# Patient Record
Sex: FEMALE | Race: ASIAN | NOT HISPANIC OR LATINO
[De-identification: names, ages, dates, MRNs, and addresses within clinical notes are randomized per-mention and may not be internally consistent; named-entity substitution may affect disease eponyms.]

---

## 2019-05-16 PROBLEM — Z00.00 ENCOUNTER FOR PREVENTIVE HEALTH EXAMINATION: Status: ACTIVE | Noted: 2019-05-16

## 2019-06-13 ENCOUNTER — APPOINTMENT (OUTPATIENT)
Dept: ANTEPARTUM | Facility: CLINIC | Age: 34
End: 2019-06-13
Payer: MEDICARE

## 2019-06-13 ENCOUNTER — ASOB RESULT (OUTPATIENT)
Age: 34
End: 2019-06-13

## 2019-06-13 PROCEDURE — 76813 OB US NUCHAL MEAS 1 GEST: CPT

## 2019-06-13 PROCEDURE — 36416 COLLJ CAPILLARY BLOOD SPEC: CPT

## 2019-06-13 PROCEDURE — 76801 OB US < 14 WKS SINGLE FETUS: CPT

## 2019-07-25 ENCOUNTER — APPOINTMENT (OUTPATIENT)
Dept: ANTEPARTUM | Facility: CLINIC | Age: 34
End: 2019-07-25
Payer: COMMERCIAL

## 2019-07-25 ENCOUNTER — ASOB RESULT (OUTPATIENT)
Age: 34
End: 2019-07-25

## 2019-07-25 PROCEDURE — 99212 OFFICE O/P EST SF 10 MIN: CPT | Mod: 25

## 2019-07-25 PROCEDURE — 76811 OB US DETAILED SNGL FETUS: CPT

## 2019-08-23 ENCOUNTER — ASOB RESULT (OUTPATIENT)
Age: 34
End: 2019-08-23

## 2019-08-23 ENCOUNTER — APPOINTMENT (OUTPATIENT)
Dept: ANTEPARTUM | Facility: CLINIC | Age: 34
End: 2019-08-23
Payer: COMMERCIAL

## 2019-08-23 PROCEDURE — 59020 FETAL CONTRACT STRESS TEST: CPT

## 2019-08-23 PROCEDURE — 76816 OB US FOLLOW-UP PER FETUS: CPT

## 2019-08-23 PROCEDURE — 76817 TRANSVAGINAL US OBSTETRIC: CPT

## 2019-08-23 PROCEDURE — 99241 OFFICE CONSULTATION NEW/ESTAB PATIENT 15 MIN: CPT | Mod: 25

## 2019-09-05 ENCOUNTER — OUTPATIENT (OUTPATIENT)
Dept: INPATIENT UNIT | Facility: HOSPITAL | Age: 34
LOS: 1 days | Discharge: HOME | End: 2019-09-05

## 2019-09-05 VITALS
TEMPERATURE: 98 F | SYSTOLIC BLOOD PRESSURE: 110 MMHG | HEART RATE: 98 BPM | DIASTOLIC BLOOD PRESSURE: 63 MMHG | OXYGEN SATURATION: 100 % | RESPIRATION RATE: 16 BRPM

## 2019-09-05 DIAGNOSIS — O26.899 OTHER SPECIFIED PREGNANCY RELATED CONDITIONS, UNSPECIFIED TRIMESTER: ICD-10-CM

## 2019-09-05 DIAGNOSIS — Z3A.00 WEEKS OF GESTATION OF PREGNANCY NOT SPECIFIED: ICD-10-CM

## 2019-09-06 VITALS — DIASTOLIC BLOOD PRESSURE: 79 MMHG | SYSTOLIC BLOOD PRESSURE: 115 MMHG | HEART RATE: 85 BPM

## 2019-09-06 LAB
APPEARANCE UR: CLEAR — SIGNIFICANT CHANGE UP
BILIRUB UR-MCNC: NEGATIVE — SIGNIFICANT CHANGE UP
BLOOD UR QL VISUAL: NEGATIVE — SIGNIFICANT CHANGE UP
COLOR SPEC: SIGNIFICANT CHANGE UP
GLUCOSE UR-MCNC: NEGATIVE — SIGNIFICANT CHANGE UP
KETONES UR-MCNC: NEGATIVE — SIGNIFICANT CHANGE UP
LEUKOCYTE ESTERASE UR-ACNC: NEGATIVE — SIGNIFICANT CHANGE UP
NITRITE UR-MCNC: NEGATIVE — SIGNIFICANT CHANGE UP
PH UR: 6.5 — SIGNIFICANT CHANGE UP (ref 5–8)
PROT UR-MCNC: NEGATIVE — SIGNIFICANT CHANGE UP
SP GR SPEC: 1.02 — SIGNIFICANT CHANGE UP (ref 1–1.04)
UROBILINOGEN FLD QL: NORMAL — SIGNIFICANT CHANGE UP

## 2019-09-06 NOTE — OB PROVIDER TRIAGE NOTE - NSHPLABSRESULTS_GEN_ALL_CORE
Urinalysis Basic - ( 06 Sep 2019 03:00 )    Color: LIGHT YELLOW / Appearance: CLEAR / S.016 / pH: 6.5  Gluc: NEGATIVE / Ketone: NEGATIVE  / Bili: NEGATIVE / Urobili: NORMAL   Blood: NEGATIVE / Protein: NEGATIVE / Nitrite: NEGATIVE   Leuk Esterase: NEGATIVE / RBC: x / WBC x   Sq Epi: x / Non Sq Epi: x / Bacteria: x

## 2019-09-06 NOTE — OB PROVIDER TRIAGE NOTE - PLAN OF CARE
D/C Home  D/W Dr. Montalvo  No further evidence of cervical shortening  Normal fetal testing  Follow up with the ATU today for further evaluation- [phone]  Return for decreased fetal movement, loss of fluid or vaginal bleeding   Continue vaginal progesterone  Continue pelvic rest

## 2019-09-06 NOTE — OB PROVIDER TRIAGE NOTE - HISTORY OF PRESENT ILLNESS
35y/o  @25.1wks presents from the office for vaginal pressure and short cervix. Patient states they told her in the office today it was 1.8cm. Last ATU scan on  was 1.6cm.   HX of incompetant cervix  Denies LOF/VB  Reports good fetal movement     Allergies:Denies  Medications: ASA, Vaginal Progesterone, PNV    Medical HX: Hyperlipidemia  Surgical HX: Denies  Denies Etoh/Drugs/Smoke/Psy HX

## 2019-09-06 NOTE — OB PROVIDER TRIAGE NOTE - ADDITIONAL INSTRUCTIONS
Follow up with the ATU today for further evaluation- [phone]  Return for decreased fetal movement, loss of fluid or vaginal bleeding   Continue vaginal progesterone  Continue pelvic rest

## 2019-09-06 NOTE — OB PROVIDER TRIAGE NOTE - NSHPPHYSICALEXAM_GEN_ALL_CORE
Assessment reveals VSS  Abdomen soft, NT, gravid   SSE: cervix appears closed, no blood or discharged noted  TAS: bpp8/8, MVP6.59, vtx, post placenta,   TVS: Cervical length 1.6-1.8cm, minimal funneling  Cat 1 tracing, [id number], no ctx on TOCO Assessment reveals VSS  Abdomen soft, NT, gravid   SSE: cervix appears closed, no blood or discharged noted  TAS: bpp8/8, MVP6.59, vtx, post placenta,   TVS: Cervical length 1.6-1.8cm, minimal funneling  Cat 1 tracing, [id number], no ctx on TOCO    PLAN: UA Pending

## 2019-09-06 NOTE — OB PROVIDER TRIAGE NOTE - NS_OBGYNHISTORY_OBGYN_ALL_OB_FT
GYN: Denies  OB:  3/28/2018, FT, stillborn      AP course complicated by incompetent cervix  on Vaginal Progesterone

## 2019-09-07 PROBLEM — E78.5 HYPERLIPIDEMIA, UNSPECIFIED: Chronic | Status: ACTIVE | Noted: 2019-09-05

## 2019-09-11 ENCOUNTER — APPOINTMENT (OUTPATIENT)
Dept: ANTEPARTUM | Facility: CLINIC | Age: 34
End: 2019-09-11

## 2019-09-20 ENCOUNTER — ASOB RESULT (OUTPATIENT)
Age: 34
End: 2019-09-20

## 2019-09-20 ENCOUNTER — APPOINTMENT (OUTPATIENT)
Dept: ANTEPARTUM | Facility: CLINIC | Age: 34
End: 2019-09-20
Payer: COMMERCIAL

## 2019-09-20 PROCEDURE — 76819 FETAL BIOPHYS PROFIL W/O NST: CPT

## 2019-09-20 PROCEDURE — 76816 OB US FOLLOW-UP PER FETUS: CPT

## 2019-10-07 ENCOUNTER — ASOB RESULT (OUTPATIENT)
Age: 34
End: 2019-10-07

## 2019-10-07 ENCOUNTER — OUTPATIENT (OUTPATIENT)
Dept: OUTPATIENT SERVICES | Facility: HOSPITAL | Age: 34
LOS: 1 days | End: 2019-10-07

## 2019-10-07 ENCOUNTER — INPATIENT (INPATIENT)
Facility: HOSPITAL | Age: 34
LOS: 0 days | Discharge: HOME | End: 2019-10-08
Attending: OBSTETRICS & GYNECOLOGY | Admitting: OBSTETRICS & GYNECOLOGY

## 2019-10-07 ENCOUNTER — APPOINTMENT (OUTPATIENT)
Dept: ANTEPARTUM | Facility: HOSPITAL | Age: 34
End: 2019-10-07
Payer: COMMERCIAL

## 2019-10-07 VITALS
HEART RATE: 100 BPM | DIASTOLIC BLOOD PRESSURE: 73 MMHG | RESPIRATION RATE: 14 BRPM | TEMPERATURE: 98 F | SYSTOLIC BLOOD PRESSURE: 106 MMHG

## 2019-10-07 DIAGNOSIS — O26.899 OTHER SPECIFIED PREGNANCY RELATED CONDITIONS, UNSPECIFIED TRIMESTER: ICD-10-CM

## 2019-10-07 DIAGNOSIS — Z3A.00 WEEKS OF GESTATION OF PREGNANCY NOT SPECIFIED: ICD-10-CM

## 2019-10-07 DIAGNOSIS — O60.03 PRETERM LABOR WITHOUT DELIVERY, THIRD TRIMESTER: ICD-10-CM

## 2019-10-07 DIAGNOSIS — O60.00 PRETERM LABOR WITHOUT DELIVERY, UNSPECIFIED TRIMESTER: ICD-10-CM

## 2019-10-07 LAB
ANISOCYTOSIS BLD QL: SLIGHT — SIGNIFICANT CHANGE UP
APPEARANCE UR: CLEAR — SIGNIFICANT CHANGE UP
BASOPHILS # BLD AUTO: 0.06 K/UL — SIGNIFICANT CHANGE UP (ref 0–0.2)
BASOPHILS NFR BLD AUTO: 0.5 % — SIGNIFICANT CHANGE UP (ref 0–2)
BASOPHILS NFR SPEC: 0.9 % — SIGNIFICANT CHANGE UP (ref 0–2)
BILIRUB UR-MCNC: NEGATIVE — SIGNIFICANT CHANGE UP
BLASTS # FLD: 0 % — SIGNIFICANT CHANGE UP (ref 0–0)
BLD GP AB SCN SERPL QL: NEGATIVE — SIGNIFICANT CHANGE UP
BLOOD UR QL VISUAL: NEGATIVE — SIGNIFICANT CHANGE UP
COLOR SPEC: YELLOW — SIGNIFICANT CHANGE UP
EOSINOPHIL # BLD AUTO: 0.19 K/UL — SIGNIFICANT CHANGE UP (ref 0–0.5)
EOSINOPHIL NFR BLD AUTO: 1.6 % — SIGNIFICANT CHANGE UP (ref 0–6)
EOSINOPHIL NFR FLD: 0.9 % — SIGNIFICANT CHANGE UP (ref 0–6)
GLUCOSE UR-MCNC: NEGATIVE — SIGNIFICANT CHANGE UP
HCT VFR BLD CALC: 33.6 % — LOW (ref 34.5–45)
HGB BLD-MCNC: 11.1 G/DL — LOW (ref 11.5–15.5)
HYPOCHROMIA BLD QL: SLIGHT — SIGNIFICANT CHANGE UP
IMM GRANULOCYTES NFR BLD AUTO: 3 % — HIGH (ref 0–1.5)
KETONES UR-MCNC: NEGATIVE — SIGNIFICANT CHANGE UP
LEUKOCYTE ESTERASE UR-ACNC: NEGATIVE — SIGNIFICANT CHANGE UP
LYMPHOCYTES # BLD AUTO: 1.96 K/UL — SIGNIFICANT CHANGE UP (ref 1–3.3)
LYMPHOCYTES # BLD AUTO: 16 % — SIGNIFICANT CHANGE UP (ref 13–44)
LYMPHOCYTES NFR SPEC AUTO: 11.4 % — LOW (ref 13–44)
MACROCYTES BLD QL: SLIGHT — SIGNIFICANT CHANGE UP
MAGNESIUM SERPL-MCNC: 5.3 MG/DL — HIGH (ref 1.6–2.6)
MCHC RBC-ENTMCNC: 29 PG — SIGNIFICANT CHANGE UP (ref 27–34)
MCHC RBC-ENTMCNC: 33 % — SIGNIFICANT CHANGE UP (ref 32–36)
MCV RBC AUTO: 87.7 FL — SIGNIFICANT CHANGE UP (ref 80–100)
METAMYELOCYTES # FLD: 0 % — SIGNIFICANT CHANGE UP (ref 0–1)
MONOCYTES # BLD AUTO: 0.77 K/UL — SIGNIFICANT CHANGE UP (ref 0–0.9)
MONOCYTES NFR BLD AUTO: 6.3 % — SIGNIFICANT CHANGE UP (ref 2–14)
MONOCYTES NFR BLD: 1.7 % — LOW (ref 2–9)
MYELOCYTES NFR BLD: 0 % — SIGNIFICANT CHANGE UP (ref 0–0)
NEUTROPHIL AB SER-ACNC: 78.9 % — HIGH (ref 43–77)
NEUTROPHILS # BLD AUTO: 8.89 K/UL — HIGH (ref 1.8–7.4)
NEUTROPHILS NFR BLD AUTO: 72.6 % — SIGNIFICANT CHANGE UP (ref 43–77)
NEUTS BAND # BLD: 1.8 % — SIGNIFICANT CHANGE UP (ref 0–6)
NITRITE UR-MCNC: NEGATIVE — SIGNIFICANT CHANGE UP
NRBC # FLD: 0 K/UL — SIGNIFICANT CHANGE UP (ref 0–0)
OTHER - HEMATOLOGY %: 0 — SIGNIFICANT CHANGE UP
PH UR: 7 — SIGNIFICANT CHANGE UP (ref 5–8)
PLATELET # BLD AUTO: 195 K/UL — SIGNIFICANT CHANGE UP (ref 150–400)
PLATELET COUNT - ESTIMATE: NORMAL — SIGNIFICANT CHANGE UP
PMV BLD: 9.2 FL — SIGNIFICANT CHANGE UP (ref 7–13)
POLYCHROMASIA BLD QL SMEAR: SLIGHT — SIGNIFICANT CHANGE UP
PROMYELOCYTES # FLD: 0 % — SIGNIFICANT CHANGE UP (ref 0–0)
PROT UR-MCNC: 10 — SIGNIFICANT CHANGE UP
RBC # BLD: 3.83 M/UL — SIGNIFICANT CHANGE UP (ref 3.8–5.2)
RBC # FLD: 15 % — HIGH (ref 10.3–14.5)
REVIEW TO FOLLOW: YES — SIGNIFICANT CHANGE UP
RH IG SCN BLD-IMP: POSITIVE — SIGNIFICANT CHANGE UP
RH IG SCN BLD-IMP: POSITIVE — SIGNIFICANT CHANGE UP
SP GR SPEC: 1.02 — SIGNIFICANT CHANGE UP (ref 1–1.04)
UROBILINOGEN FLD QL: NORMAL — SIGNIFICANT CHANGE UP
VARIANT LYMPHS # BLD: 4.4 % — SIGNIFICANT CHANGE UP
WBC # BLD: 12.24 K/UL — HIGH (ref 3.8–10.5)
WBC # FLD AUTO: 12.24 K/UL — HIGH (ref 3.8–10.5)

## 2019-10-07 PROCEDURE — 76819 FETAL BIOPHYS PROFIL W/O NST: CPT | Mod: 26

## 2019-10-07 RX ORDER — PROGESTERONE 200 MG/1
200 CAPSULE, LIQUID FILLED ORAL AT BEDTIME
Refills: 0 | Status: DISCONTINUED | OUTPATIENT
Start: 2019-10-08 | End: 2019-10-08

## 2019-10-07 RX ORDER — OXYTOCIN 10 UNIT/ML
VIAL (ML) INJECTION
Qty: 20 | Refills: 0 | Status: DISCONTINUED | OUTPATIENT
Start: 2019-10-07 | End: 2019-10-08

## 2019-10-07 RX ORDER — MAGNESIUM SULFATE 500 MG/ML
4 VIAL (ML) INJECTION ONCE
Refills: 0 | Status: COMPLETED | OUTPATIENT
Start: 2019-10-07 | End: 2019-10-07

## 2019-10-07 RX ORDER — AMPICILLIN TRIHYDRATE 250 MG
1 CAPSULE ORAL EVERY 4 HOURS
Refills: 0 | Status: DISCONTINUED | OUTPATIENT
Start: 2019-10-07 | End: 2019-10-08

## 2019-10-07 RX ORDER — AMPICILLIN TRIHYDRATE 250 MG
2 CAPSULE ORAL ONCE
Refills: 0 | Status: COMPLETED | OUTPATIENT
Start: 2019-10-07 | End: 2019-10-07

## 2019-10-07 RX ORDER — ASPIRIN/CALCIUM CARB/MAGNESIUM 324 MG
81 TABLET ORAL DAILY
Refills: 0 | Status: DISCONTINUED | OUTPATIENT
Start: 2019-10-07 | End: 2019-10-08

## 2019-10-07 RX ORDER — PROGESTERONE 200 MG/1
100 CAPSULE, LIQUID FILLED ORAL ONCE
Refills: 0 | Status: COMPLETED | OUTPATIENT
Start: 2019-10-07 | End: 2019-10-08

## 2019-10-07 RX ORDER — INFLUENZA VIRUS VACCINE 15; 15; 15; 15 UG/.5ML; UG/.5ML; UG/.5ML; UG/.5ML
0.5 SUSPENSION INTRAMUSCULAR ONCE
Refills: 0 | Status: DISCONTINUED | OUTPATIENT
Start: 2019-10-07 | End: 2019-10-08

## 2019-10-07 RX ORDER — PROGESTERONE 200 MG/1
100 CAPSULE, LIQUID FILLED ORAL AT BEDTIME
Refills: 0 | Status: DISCONTINUED | OUTPATIENT
Start: 2019-10-07 | End: 2019-10-07

## 2019-10-07 RX ORDER — MAGNESIUM SULFATE 500 MG/ML
2 VIAL (ML) INJECTION
Qty: 40 | Refills: 0 | Status: DISCONTINUED | OUTPATIENT
Start: 2019-10-07 | End: 2019-10-08

## 2019-10-07 RX ORDER — SODIUM CHLORIDE 9 MG/ML
1000 INJECTION, SOLUTION INTRAVENOUS
Refills: 0 | Status: DISCONTINUED | OUTPATIENT
Start: 2019-10-07 | End: 2019-10-08

## 2019-10-07 RX ADMIN — Medication 108 GRAM(S): at 16:27

## 2019-10-07 RX ADMIN — Medication 50 GM/HR: at 12:30

## 2019-10-07 RX ADMIN — Medication 12 MILLIGRAM(S): at 12:04

## 2019-10-07 RX ADMIN — Medication 81 MILLIGRAM(S): at 21:50

## 2019-10-07 RX ADMIN — Medication 50 GM/HR: at 18:50

## 2019-10-07 RX ADMIN — Medication 1 TABLET(S): at 21:50

## 2019-10-07 RX ADMIN — Medication 300 GRAM(S): at 12:00

## 2019-10-07 RX ADMIN — Medication 216 GRAM(S): at 12:25

## 2019-10-07 RX ADMIN — SODIUM CHLORIDE 75 MILLILITER(S): 9 INJECTION, SOLUTION INTRAVENOUS at 12:04

## 2019-10-07 RX ADMIN — PROGESTERONE 100 MILLIGRAM(S): 200 CAPSULE, LIQUID FILLED ORAL at 22:09

## 2019-10-07 RX ADMIN — Medication 108 GRAM(S): at 20:22

## 2019-10-07 NOTE — OB PROVIDER TRIAGE NOTE - NS_MATERNALCONCERNS_OBGYN_ALL_OB_FT
- short cervix, last measured 1.6-1.8  on vaginal progesterone nightly  - hx of IUFD at 36 weeks 2018, weekly ATU testing

## 2019-10-07 NOTE — OB PROVIDER H&P - PROBLEM SELECTOR PLAN 1
plan discussed with dr vazquez  betamethasone for fetal lung maturity  ampicillin for GBS prophalaxysis  magnesium for tocolysis/ neuroprotection  GBS, u/a urine culture sent  routine orders

## 2019-10-07 NOTE — OB PROVIDER TRIAGE NOTE - NSOBPROVIDERNOTE_OBGYN_ALL_OB_FT
34 year old  at 29.4 weeks admitted for incompetant cervix/ labor    plan discussed with dr vazquez  Magnesium for tocolysis/ neuroprotection  Betamethasone for fetal lung maturity  Antibiotics for GBS prophalaxysis  routine  orders

## 2019-10-07 NOTE — OB PROVIDER H&P - ALERT: PERTINENT HISTORY
Fetal Non-Stress Test (NST)/1st Trimester Sonogram/BioPhysical Profile(s)/Follow up Sonogram for Growth/Fetal Sonogram

## 2019-10-07 NOTE — CHART NOTE - NSCHARTNOTEFT_GEN_A_CORE
r4ob    answered patient questions regarding management and prognosis of  labor. I explained the indication for BMZ, amp, and Mg therapy. I explained that short cervix is a risk factor for  delivery but that the majority of patients with short cervix will go on to deliver at term. however there is no way of knowing at this time when exactly she might deliver. I spent 20 minutes speaking with the patient as she also expressed significant anxiety/distress during this pregnancy after having an IUFD at 36wks with the last pregnancy.     Lucia Senior PGY4

## 2019-10-07 NOTE — OB PROVIDER TRIAGE NOTE - NSHPPHYSICALEXAM_GEN_ALL_CORE
abdomen: gravid, soft, nontender, no rebound, no guarding   TAS: breech, posterior placenta, bpp 8/8, tami 14.55  SSE: cervix appears closed, clear liquidy discharge in vault, nitrazine equivacal, fern neg  TVS CL 0.6-0.7 with large funnel  SVE 1/soft/-3    Vital Signs Last 24 Hrs  T(C): 36.5 (07 Oct 2019 09:03), Max: 36.5 (07 Oct 2019 08:36)  T(F): 97.7 (07 Oct 2019 09:03), Max: 97.7 (07 Oct 2019 08:36)  HR: 92 (07 Oct 2019 10:07) (85 - 100)  BP: 113/74 (07 Oct 2019 10:07) (106/73 - 113/74)  BP(mean): --  RR: 14 (07 Oct 2019 08:36) (14 - 14)  SpO2: --

## 2019-10-07 NOTE — OB PROVIDER TRIAGE NOTE - HISTORY OF PRESENT ILLNESS
This is a 34 year old  at 29.4 weeks gestational age presents with complaints of  pressure and lower back pain since 2am. denies LOF, VB. denies cramping/contractions. reports +GFM. denies dysuria, hematuria, foul smell, urinary symptoms, frequency  ap course complicated by:  - short cervix, last measured 1.6-1.8  on vaginal progesterone nightly  - hx of IUFD at 36 weeks 2018, weekly ATU testing

## 2019-10-08 ENCOUNTER — TRANSCRIPTION ENCOUNTER (OUTPATIENT)
Age: 34
End: 2019-10-08

## 2019-10-08 VITALS — HEART RATE: 105 BPM | OXYGEN SATURATION: 99 %

## 2019-10-08 LAB
MAGNESIUM SERPL-MCNC: 5.8 MG/DL — HIGH (ref 1.6–2.6)
MAGNESIUM SERPL-MCNC: 6.3 MG/DL — HIGH (ref 1.6–2.6)
SPECIMEN SOURCE: SIGNIFICANT CHANGE UP
SPECIMEN SOURCE: SIGNIFICANT CHANGE UP
T PALLIDUM AB TITR SER: NEGATIVE — SIGNIFICANT CHANGE UP

## 2019-10-08 RX ORDER — CALCIUM CARBONATE 500(1250)
1 TABLET ORAL
Refills: 0 | Status: DISCONTINUED | OUTPATIENT
Start: 2019-10-08 | End: 2019-10-08

## 2019-10-08 RX ORDER — PROGESTERONE 200 MG/1
0 CAPSULE, LIQUID FILLED ORAL
Qty: 0 | Refills: 0 | DISCHARGE

## 2019-10-08 RX ORDER — PROGESTERONE 200 MG/1
0 CAPSULE, LIQUID FILLED ORAL
Qty: 0 | Refills: 0 | DISCHARGE
Start: 2019-10-08

## 2019-10-08 RX ORDER — ASPIRIN/CALCIUM CARB/MAGNESIUM 324 MG
81 TABLET ORAL
Qty: 0 | Refills: 0 | DISCHARGE

## 2019-10-08 RX ORDER — ASPIRIN/CALCIUM CARB/MAGNESIUM 324 MG
1 TABLET ORAL
Qty: 0 | Refills: 0 | DISCHARGE
Start: 2019-10-08

## 2019-10-08 RX ORDER — CALCIUM CARBONATE 500(1250)
1 TABLET ORAL
Qty: 0 | Refills: 0 | DISCHARGE
Start: 2019-10-08

## 2019-10-08 RX ADMIN — Medication 108 GRAM(S): at 04:31

## 2019-10-08 RX ADMIN — Medication 12 MILLIGRAM(S): at 11:00

## 2019-10-08 RX ADMIN — PROGESTERONE 100 MILLIGRAM(S): 200 CAPSULE, LIQUID FILLED ORAL at 00:19

## 2019-10-08 RX ADMIN — Medication 108 GRAM(S): at 00:32

## 2019-10-08 RX ADMIN — Medication 1 TABLET(S): at 10:05

## 2019-10-08 NOTE — DISCHARGE NOTE ANTEPARTUM - HOSPITAL COURSE
Pt admitted with suspected  labor, BAM given. BMZ 10/7 and 10/8. No evidence of labor without contractions or cervical change. Discharged home in stable condition on HD#2.

## 2019-10-08 NOTE — CHART NOTE - NSCHARTNOTEFT_GEN_A_CORE
Referred by OB to consult on 29 weeks gestation.  Mother is a 35 yo  (1 prior IUFD at 36 weeks) at 29+4 weeks gestation, admitted to rule out PT labor due to short cervix. Male infant. BMZ 10/7-8 12pm. On Magnesium and Ampicillin.    Ms. Charles was consulted as following.  1. The NICU team will be present at her delivery. The infant will be placed under the warmer and immediately evaluated.  2. Overall survival at 29 weeks gestation is very good.  3. The infant may require respiratory support either in the form of nasal CPAP or intubation and mechanical ventilation in rare circumstances.   4. Hemodynamic status will be carefully monitored. Baby might require additional IV fluids or IV medication to maintain blood pressure or blood sugar levels.  5. The infant will be at risk for jaundice which can be treated with phototherapy.  10. Depending on the clinical status of the infant, enteral feedings may not be started immediately. The infant will receive IV fluid or nutrition as necessary. Due to immature suck/swallow, the infant may require an orogastric tube once feeds until about 32-33 weeks.  6. Septic work up may be performed at birth and the infant might require antibiotics if there is a significant risk of infection.  7. The importance of human milk as the exclusive source of nutrition for  infants was discussed. Mother will be asked to pump after delivery.  8.  The infant may be screened for IVH. The presence and severity of IVH can have adverse neurodevelopmental effects. Even in the absence of IVH the infant will be at risk for developmental delays as a consequence of prematurity.   9. Overall duration of NICU stay will depend on the clinical condition of the baby and is hard to predict, but the discharge may often happen around 35-38 weeks of gestation (approximately 6-9 weeks stay).     Ms. Charles had the opportunity to ask questions and may contact the NICU at any time should further questions arise.  Thank you for the opportunity to participate in the care of this patients and please inform us of any changes in her status.

## 2019-10-08 NOTE — DISCHARGE NOTE ANTEPARTUM - MEDICATION SUMMARY - MEDICATIONS TO TAKE
I will START or STAY ON the medications listed below when I get home from the hospital:    aspirin 81 mg oral tablet, chewable  -- 1 tab(s) by mouth once a day  -- Indication: For home med    calcium carbonate 500 mg (200 mg elemental calcium) oral tablet, chewable  -- 1 tab(s) by mouth 4 times a day, As needed, Heartburn  -- Indication: For home med    Prenatal Multivitamins with Folic Acid 1 mg oral tablet  -- 1 tab(s) by mouth once a day  -- Indication: For home med    progesterone 100 mg vaginal insert  --  vaginally   -- Indication: For home med

## 2019-10-08 NOTE — PROGRESS NOTE ADULT - SUBJECTIVE AND OBJECTIVE BOX
[facility] Progress Note     Patient examined bedside today. No complaints. At this time denies contractions, vaginal bleeding, leakage of fluid and reports positive fetal movement.   Patient reports that her previous IUFD was diagnosed at routine OB visit. Patient noted that she was having decreased fetal movement at that time and when she came in noted to have no FH. Patient was induced at [place] and delivered vaginally. No cause was found.     Vital Signs Last 24 Hours  T(C): 36.5 (10-08-19 @ 08:47), Max: 36.9 (10-07-19 @ 19:45)  HR: 105 (10-08-19 @ 10:47) (85 - 115)  BP: 127/82 (10-08-19 @ 10:27) (81/47 - 127/82)  RR: --  SpO2: 99% (10-08-19 @ 10:47) (90% - 100%)        Labs:                        11.1   12.24 )-----------( 195      ( 07 Oct 2019 12:00 )             33.6         Tracin baseline reactive, TOCO: no contractions

## 2019-10-08 NOTE — DISCHARGE NOTE ANTEPARTUM - PLAN OF CARE
continue pregnancy and prenatal care Follow up with your doctor within 1 week. Call with contractions, decreased FM, leakage of fluid, vaginal bleeding, or any other concern.

## 2019-10-08 NOTE — DISCHARGE NOTE ANTEPARTUM - CARE PLAN
Principal Discharge DX:	 labor in third trimester without delivery  Goal:	continue pregnancy and prenatal care  Assessment and plan of treatment:	Follow up with your doctor within 1 week. Call with contractions, decreased FM, leakage of fluid, vaginal bleeding, or any other concern.

## 2019-10-08 NOTE — DISCHARGE NOTE ANTEPARTUM - PATIENT PORTAL LINK FT
You can access the FollowMyHealth Patient Portal offered by [facility] by registering at the following website: [email or web address]. By joining [facility]’s FollowMyHealth portal, you will also be able to view your health information using other applications (apps) compatible with our system.

## 2019-10-08 NOTE — DISCHARGE NOTE ANTEPARTUM - CARE PROVIDER_API CALL
Wade Bellamy)  Obstetrics and Gynecology  [address] [place] [address]  Phone: [phone]  Fax: [phone]  Follow Up Time:

## 2019-10-08 NOTE — PROGRESS NOTE ADULT - ASSESSMENT
Assessment: 34 yr  @ 29w5d admitted for concern for  labor secondary to history of short cervix and exam of /-3 in setting of contraction. Patient received 1 course of betamethasone (2nd dose today) and magnesium sulfate. At this time no evidence of  labor, contractions resolved.   Patient's history also significant for IUFD, records reviewed, no evidence of placental abruption, EFW 2100g @ 36wks.     Recommendations     As no further evidence of  labor, patient stable for discharge and close outpatient fetal surveillance Would recommend to continue weekly  testing secondary to history of IUFD and serial growth sonograms.   Strict  labor precautions reviewed with patient   Follow up with primary Ob/Gyn in 1 week     Seen with Dr. Paris ([facility] Attending)     Ezequiel Butcher MD   Maternal Fetal Medicine Fellow
33 yo  29w5d a/w cervical insufficiency and advanced cervical dilation

## 2019-10-08 NOTE — PROGRESS NOTE ADULT - PROBLEM SELECTOR PLAN 1
1. Maternal well being  - CV: hemodynamically stable  - Resp: saturating well  - GI: reg diet  - Heme: SCDs for DVT ppx  - Neuro: DTR checks while on magnesium  - ID: afebrile    2. Fetal well being  - NST BID   - BPP Mondays  - prenatal vitamin  - BMZ for fetal lung maturity  - Mg for neuroprotection  - Ampicillin for GBS unknown    C Debby pgy3

## 2019-10-09 LAB
BACTERIA UR CULT: SIGNIFICANT CHANGE UP
GP B STREP GENITAL QL CULT: SIGNIFICANT CHANGE UP

## 2019-10-15 ENCOUNTER — APPOINTMENT (OUTPATIENT)
Dept: ANTEPARTUM | Facility: CLINIC | Age: 34
End: 2019-10-15
Payer: COMMERCIAL

## 2019-10-15 ENCOUNTER — ASOB RESULT (OUTPATIENT)
Age: 34
End: 2019-10-15

## 2019-10-15 ENCOUNTER — OUTPATIENT (OUTPATIENT)
Dept: OUTPATIENT SERVICES | Facility: HOSPITAL | Age: 34
LOS: 1 days | End: 2019-10-15

## 2019-10-15 PROCEDURE — 76818 FETAL BIOPHYS PROFILE W/NST: CPT | Mod: 26

## 2019-10-15 PROCEDURE — 76816 OB US FOLLOW-UP PER FETUS: CPT

## 2019-10-16 DIAGNOSIS — Z3A.30 30 WEEKS GESTATION OF PREGNANCY: ICD-10-CM

## 2019-10-16 DIAGNOSIS — O26.873 CERVICAL SHORTENING, THIRD TRIMESTER: ICD-10-CM

## 2019-10-16 DIAGNOSIS — O99.213 OBESITY COMPLICATING PREGNANCY, THIRD TRIMESTER: ICD-10-CM

## 2019-10-16 DIAGNOSIS — O60.03 PRETERM LABOR WITHOUT DELIVERY, THIRD TRIMESTER: ICD-10-CM

## 2019-10-16 DIAGNOSIS — O09.293 SUPERVISION OF PREGNANCY WITH OTHER POOR REPRODUCTIVE OR OBSTETRIC HISTORY, THIRD TRIMESTER: ICD-10-CM

## 2019-10-18 ENCOUNTER — APPOINTMENT (OUTPATIENT)
Dept: ANTEPARTUM | Facility: HOSPITAL | Age: 34
End: 2019-10-18

## 2019-10-18 ENCOUNTER — APPOINTMENT (OUTPATIENT)
Dept: ANTEPARTUM | Facility: CLINIC | Age: 34
End: 2019-10-18

## 2019-10-25 ENCOUNTER — ASOB RESULT (OUTPATIENT)
Age: 34
End: 2019-10-25

## 2019-10-25 ENCOUNTER — APPOINTMENT (OUTPATIENT)
Dept: ANTEPARTUM | Facility: CLINIC | Age: 34
End: 2019-10-25
Payer: COMMERCIAL

## 2019-10-25 ENCOUNTER — OUTPATIENT (OUTPATIENT)
Dept: OUTPATIENT SERVICES | Facility: HOSPITAL | Age: 34
LOS: 1 days | End: 2019-10-25

## 2019-10-25 ENCOUNTER — APPOINTMENT (OUTPATIENT)
Dept: ANTEPARTUM | Facility: HOSPITAL | Age: 34
End: 2019-10-25

## 2019-10-25 DIAGNOSIS — O09.293 SUPERVISION OF PREGNANCY WITH OTHER POOR REPRODUCTIVE OR OBSTETRIC HISTORY, THIRD TRIMESTER: ICD-10-CM

## 2019-10-25 DIAGNOSIS — O26.873 CERVICAL SHORTENING, THIRD TRIMESTER: ICD-10-CM

## 2019-10-25 DIAGNOSIS — O60.03 PRETERM LABOR WITHOUT DELIVERY, THIRD TRIMESTER: ICD-10-CM

## 2019-10-25 PROCEDURE — 76818 FETAL BIOPHYS PROFILE W/NST: CPT | Mod: 26

## 2019-11-01 ENCOUNTER — OUTPATIENT (OUTPATIENT)
Dept: OUTPATIENT SERVICES | Facility: HOSPITAL | Age: 34
LOS: 1 days | End: 2019-11-01

## 2019-11-01 ENCOUNTER — APPOINTMENT (OUTPATIENT)
Dept: ANTEPARTUM | Facility: CLINIC | Age: 34
End: 2019-11-01
Payer: COMMERCIAL

## 2019-11-01 ENCOUNTER — APPOINTMENT (OUTPATIENT)
Dept: ANTEPARTUM | Facility: HOSPITAL | Age: 34
End: 2019-11-01

## 2019-11-01 ENCOUNTER — ASOB RESULT (OUTPATIENT)
Age: 34
End: 2019-11-01

## 2019-11-01 PROCEDURE — 76818 FETAL BIOPHYS PROFILE W/NST: CPT | Mod: 26

## 2019-11-06 DIAGNOSIS — O09.293 SUPERVISION OF PREGNANCY WITH OTHER POOR REPRODUCTIVE OR OBSTETRIC HISTORY, THIRD TRIMESTER: ICD-10-CM

## 2019-11-06 DIAGNOSIS — O26.873 CERVICAL SHORTENING, THIRD TRIMESTER: ICD-10-CM

## 2019-11-06 DIAGNOSIS — O60.03 PRETERM LABOR WITHOUT DELIVERY, THIRD TRIMESTER: ICD-10-CM

## 2019-11-08 ENCOUNTER — APPOINTMENT (OUTPATIENT)
Dept: ANTEPARTUM | Facility: CLINIC | Age: 34
End: 2019-11-08
Payer: COMMERCIAL

## 2019-11-08 ENCOUNTER — ASOB RESULT (OUTPATIENT)
Age: 34
End: 2019-11-08

## 2019-11-08 ENCOUNTER — OUTPATIENT (OUTPATIENT)
Dept: OUTPATIENT SERVICES | Facility: HOSPITAL | Age: 34
LOS: 1 days | End: 2019-11-08

## 2019-11-08 ENCOUNTER — APPOINTMENT (OUTPATIENT)
Dept: ANTEPARTUM | Facility: HOSPITAL | Age: 34
End: 2019-11-08

## 2019-11-08 PROCEDURE — 76818 FETAL BIOPHYS PROFILE W/NST: CPT | Mod: 26

## 2019-11-08 PROCEDURE — 76816 OB US FOLLOW-UP PER FETUS: CPT

## 2019-11-15 ENCOUNTER — OUTPATIENT (OUTPATIENT)
Dept: OUTPATIENT SERVICES | Facility: HOSPITAL | Age: 34
LOS: 1 days | End: 2019-11-15

## 2019-11-15 ENCOUNTER — APPOINTMENT (OUTPATIENT)
Dept: ANTEPARTUM | Facility: CLINIC | Age: 34
End: 2019-11-15
Payer: COMMERCIAL

## 2019-11-15 ENCOUNTER — ASOB RESULT (OUTPATIENT)
Age: 34
End: 2019-11-15

## 2019-11-15 ENCOUNTER — APPOINTMENT (OUTPATIENT)
Dept: ANTEPARTUM | Facility: HOSPITAL | Age: 34
End: 2019-11-15

## 2019-11-15 PROCEDURE — 76818 FETAL BIOPHYS PROFILE W/NST: CPT | Mod: 26

## 2019-11-20 DIAGNOSIS — O09.293 SUPERVISION OF PREGNANCY WITH OTHER POOR REPRODUCTIVE OR OBSTETRIC HISTORY, THIRD TRIMESTER: ICD-10-CM

## 2019-11-20 DIAGNOSIS — O26.873 CERVICAL SHORTENING, THIRD TRIMESTER: ICD-10-CM

## 2019-11-20 DIAGNOSIS — O60.03 PRETERM LABOR WITHOUT DELIVERY, THIRD TRIMESTER: ICD-10-CM

## 2019-11-22 ENCOUNTER — APPOINTMENT (OUTPATIENT)
Dept: ANTEPARTUM | Facility: HOSPITAL | Age: 34
End: 2019-11-22

## 2019-11-22 ENCOUNTER — ASOB RESULT (OUTPATIENT)
Age: 34
End: 2019-11-22

## 2019-11-22 ENCOUNTER — APPOINTMENT (OUTPATIENT)
Dept: ANTEPARTUM | Facility: CLINIC | Age: 34
End: 2019-11-22
Payer: COMMERCIAL

## 2019-11-22 ENCOUNTER — OUTPATIENT (OUTPATIENT)
Dept: OUTPATIENT SERVICES | Facility: HOSPITAL | Age: 34
LOS: 1 days | End: 2019-11-22

## 2019-11-22 PROCEDURE — 76818 FETAL BIOPHYS PROFILE W/NST: CPT | Mod: 26

## 2019-11-29 ENCOUNTER — APPOINTMENT (OUTPATIENT)
Dept: ANTEPARTUM | Facility: CLINIC | Age: 34
End: 2019-11-29
Payer: COMMERCIAL

## 2019-11-29 ENCOUNTER — APPOINTMENT (OUTPATIENT)
Dept: ANTEPARTUM | Facility: HOSPITAL | Age: 34
End: 2019-11-29

## 2019-11-29 ENCOUNTER — ASOB RESULT (OUTPATIENT)
Age: 34
End: 2019-11-29

## 2019-11-29 ENCOUNTER — APPOINTMENT (OUTPATIENT)
Dept: ANTEPARTUM | Facility: CLINIC | Age: 34
End: 2019-11-29

## 2019-11-29 ENCOUNTER — OUTPATIENT (OUTPATIENT)
Dept: OUTPATIENT SERVICES | Facility: HOSPITAL | Age: 34
LOS: 1 days | End: 2019-11-29

## 2019-11-29 PROCEDURE — 76816 OB US FOLLOW-UP PER FETUS: CPT

## 2019-11-29 PROCEDURE — 76818 FETAL BIOPHYS PROFILE W/NST: CPT | Mod: 26

## 2019-12-05 ENCOUNTER — OUTPATIENT (OUTPATIENT)
Dept: OUTPATIENT SERVICES | Facility: HOSPITAL | Age: 34
LOS: 1 days | End: 2019-12-05

## 2019-12-05 VITALS
TEMPERATURE: 97 F | SYSTOLIC BLOOD PRESSURE: 110 MMHG | OXYGEN SATURATION: 98 % | RESPIRATION RATE: 14 BRPM | DIASTOLIC BLOOD PRESSURE: 78 MMHG | HEIGHT: 58.25 IN | HEART RATE: 95 BPM | WEIGHT: 177.91 LBS

## 2019-12-05 DIAGNOSIS — O13.9 GESTATIONAL [PREGNANCY-INDUCED] HYPERTENSION WITHOUT SIGNIFICANT PROTEINURIA, UNSPECIFIED TRIMESTER: ICD-10-CM

## 2019-12-05 DIAGNOSIS — Z34.90 ENCOUNTER FOR SUPERVISION OF NORMAL PREGNANCY, UNSPECIFIED, UNSPECIFIED TRIMESTER: ICD-10-CM

## 2019-12-05 LAB
APPEARANCE UR: CLEAR — SIGNIFICANT CHANGE UP
BILIRUB UR-MCNC: NEGATIVE — SIGNIFICANT CHANGE UP
BLD GP AB SCN SERPL QL: NEGATIVE — SIGNIFICANT CHANGE UP
BLOOD UR QL VISUAL: NEGATIVE — SIGNIFICANT CHANGE UP
COLOR SPEC: YELLOW — SIGNIFICANT CHANGE UP
GLUCOSE UR-MCNC: NEGATIVE — SIGNIFICANT CHANGE UP
HCT VFR BLD CALC: 35.9 % — SIGNIFICANT CHANGE UP (ref 34.5–45)
HGB BLD-MCNC: 11.4 G/DL — LOW (ref 11.5–15.5)
KETONES UR-MCNC: NEGATIVE — SIGNIFICANT CHANGE UP
LEUKOCYTE ESTERASE UR-ACNC: NEGATIVE — SIGNIFICANT CHANGE UP
MCHC RBC-ENTMCNC: 29.3 PG — SIGNIFICANT CHANGE UP (ref 27–34)
MCHC RBC-ENTMCNC: 31.8 % — LOW (ref 32–36)
MCV RBC AUTO: 92.3 FL — SIGNIFICANT CHANGE UP (ref 80–100)
NITRITE UR-MCNC: NEGATIVE — SIGNIFICANT CHANGE UP
NRBC # FLD: 0 K/UL — SIGNIFICANT CHANGE UP (ref 0–0)
PH UR: 6.5 — SIGNIFICANT CHANGE UP (ref 5–8)
PLATELET # BLD AUTO: 225 K/UL — SIGNIFICANT CHANGE UP (ref 150–400)
PMV BLD: 9.4 FL — SIGNIFICANT CHANGE UP (ref 7–13)
PROT UR-MCNC: NEGATIVE — SIGNIFICANT CHANGE UP
RBC # BLD: 3.89 M/UL — SIGNIFICANT CHANGE UP (ref 3.8–5.2)
RBC # FLD: 16.6 % — HIGH (ref 10.3–14.5)
RH IG SCN BLD-IMP: POSITIVE — SIGNIFICANT CHANGE UP
SP GR SPEC: 1.01 — SIGNIFICANT CHANGE UP (ref 1–1.04)
T PALLIDUM AB TITR SER: NEGATIVE — SIGNIFICANT CHANGE UP
UROBILINOGEN FLD QL: NORMAL — SIGNIFICANT CHANGE UP
WBC # BLD: 9.81 K/UL — SIGNIFICANT CHANGE UP (ref 3.8–10.5)
WBC # FLD AUTO: 9.81 K/UL — SIGNIFICANT CHANGE UP (ref 3.8–10.5)

## 2019-12-05 NOTE — OB PST NOTE - PMH
Hyperlipidemia    Normal vaginal delivery  3/28/2018 Ft stillborn @36wks Hyperlipidemia    Normal vaginal delivery  3/28/2018 Ft stillborn @36wks  Postpartum hypertension  2018 Hyperlipidemia    Normal vaginal delivery  3/28/2018 Ft stillborn @36wks  Postpartum hypertension  2018, pt. denies having ever taken anti hypertensive medication

## 2019-12-05 NOTE — OB PST NOTE - NSANTHOSAYNRD_GEN_A_CORE
No. CHIN screening performed.  STOP BANG Legend: 0-2 = LOW Risk; 3-4 = INTERMEDIATE Risk; 5-8 = HIGH Risk

## 2019-12-05 NOTE — OB PST NOTE - PROBLEM SELECTOR PLAN 1
Pt. is scheduled for an induction 12/12/19.  Pt. instructed to obtain instruction on medication especially ASA prior to procedure.  Pt. verbalized understanding of instructions as discussed and outlined on the instruction sheet.

## 2019-12-05 NOTE — OB PST NOTE - FAMILY HISTORY
Mother  Still living? Yes, Estimated age: Age Unknown  Family history of thyroid disease, Age at diagnosis: Age Unknown  Type 2 diabetes mellitus, Age at diagnosis: Age Unknown     Grandparent  Still living? Yes, Estimated age: Age Unknown  Family history of thyroid disease, Age at diagnosis: Age Unknown     Father  Still living? Yes, Estimated age: Age Unknown  Type 2 diabetes mellitus, Age at diagnosis: Age Unknown  Hypertension, Age at diagnosis: Age Unknown

## 2019-12-05 NOTE — OB PST NOTE - NSHPREVIEWOFSYSTEMS_GEN_ALL_CORE
General: No fever, chills, sweating, anorexia, weight loss or weight gain. No polyphagia, polyurea, polydypsia, malaise, or fatigue    Skin: No rashes, itching, or dryness. No change in size/color of moles. No tumors, brittle nails, pitted nails, or hair loss    Breast: No tenderness, lumps, or nipple discharge      Ophthalmologic: No diplopia, photophobia, lacrimation, blurred Vision , or eye discharge    ENMT Symptoms: No hearing difficulty, ear pain, tinnitus, or vertigo. No sinus symptoms, nasal congestion, nasal   discharge, or nasal obstruction    Respiratory SOB past 2-3 weeks "especially lying down but when I sit down like this I'm fine"    Cardiovascular: No chest pain, palpitations, dyspnea on exertion, orthopnea, paroxysmal nocturnal dyspnea,   peripheral edema, or claudication    Gastrointestinal: diarrhea, constipation    Genitourinary/ Pelvis: No hematuria, renal colic, or flank pain.  No urine discoloration, incontinence, dysuria, or urinary hesitancy. Normal urinary frequency. No nocturia, abnormal vaginal bleeding, vaginal discharge, spotting, pelvic pain, or vaginal leakage    Musculoskeletal: lower back, "under the breasts, rib, both sides and the pelvic pressure"    Neurological: No transient paralysis, weakness, paresthesias, or seizures. No syncope, tremors, vertigo, loss of sensation, difficulty walking, loss of consciousness, hemiparesis, confusion, or facial palsy    Psychiatric: No suicidal ideation, depression, anxiety, insomnia, memory loss, paranoia, mood swings, agitation, hallucinations, or hyperactivity    Hematology: No gum bleeding, nose bleeding, or skin lumps    Lymphatic: No enlarged or tender lymph nodes. No extremity swelling    Endocrine: No heat or cold intolerance    Immunologic: No recurrent or persistent infections

## 2019-12-05 NOTE — OB PST NOTE - ASSESSMENT
Pt. is a 33 yo pregnant female accompanied by her .  Pt. previously had postpartum HTN. Pt. is a 33 yo pregnant female accompanied by her .  Pt. previously had postpartum HTN.    Pt. has what she describes as pregnancy related SOB.  She notices it while lying down.  Instructed pt. to inform her OB today.

## 2019-12-05 NOTE — OB PST NOTE - HISTORY OF PRESENT ILLNESS
Pt. is a 33 yo pregnant female that had a prior stillbirth.  Pt. had HTN immediately following that delivery.  Pt. states she was never put on medication. Pt. is a 33 yo pregnant female that had a prior stillbirth.  Pt. had HTN immediately following that delivery.  Pt. states she was never put on medication.  Pt's. JAYDEN is 12/19/19.

## 2019-12-06 ENCOUNTER — ASOB RESULT (OUTPATIENT)
Age: 34
End: 2019-12-06

## 2019-12-06 ENCOUNTER — APPOINTMENT (OUTPATIENT)
Dept: ANTEPARTUM | Facility: CLINIC | Age: 34
End: 2019-12-06
Payer: COMMERCIAL

## 2019-12-06 ENCOUNTER — OUTPATIENT (OUTPATIENT)
Dept: OUTPATIENT SERVICES | Facility: HOSPITAL | Age: 34
LOS: 1 days | End: 2019-12-06

## 2019-12-06 ENCOUNTER — APPOINTMENT (OUTPATIENT)
Dept: ANTEPARTUM | Facility: HOSPITAL | Age: 34
End: 2019-12-06

## 2019-12-06 PROCEDURE — 76818 FETAL BIOPHYS PROFILE W/NST: CPT | Mod: 26

## 2019-12-09 ENCOUNTER — INPATIENT (INPATIENT)
Facility: HOSPITAL | Age: 34
LOS: 1 days | Discharge: HOME | End: 2019-12-11
Attending: OBSTETRICS & GYNECOLOGY | Admitting: OBSTETRICS & GYNECOLOGY
Payer: COMMERCIAL

## 2019-12-09 ENCOUNTER — TRANSCRIPTION ENCOUNTER (OUTPATIENT)
Age: 34
End: 2019-12-09

## 2019-12-09 VITALS — SYSTOLIC BLOOD PRESSURE: 140 MMHG | DIASTOLIC BLOOD PRESSURE: 95 MMHG | HEART RATE: 129 BPM

## 2019-12-09 DIAGNOSIS — Z3A.00 WEEKS OF GESTATION OF PREGNANCY NOT SPECIFIED: ICD-10-CM

## 2019-12-09 DIAGNOSIS — O26.899 OTHER SPECIFIED PREGNANCY RELATED CONDITIONS, UNSPECIFIED TRIMESTER: ICD-10-CM

## 2019-12-09 LAB
ALBUMIN SERPL ELPH-MCNC: 3.1 G/DL — LOW (ref 3.3–5)
ALP SERPL-CCNC: 197 U/L — HIGH (ref 40–120)
ALT FLD-CCNC: 12 U/L — SIGNIFICANT CHANGE UP (ref 4–33)
ANION GAP SERPL CALC-SCNC: 14 MMO/L — SIGNIFICANT CHANGE UP (ref 7–14)
APPEARANCE UR: CLEAR — SIGNIFICANT CHANGE UP
APTT BLD: 19.6 SEC — LOW (ref 27.5–36.3)
APTT BLD: 25.9 SEC — LOW (ref 27.5–36.3)
AST SERPL-CCNC: 17 U/L — SIGNIFICANT CHANGE UP (ref 4–32)
BACTERIA # UR AUTO: NEGATIVE — SIGNIFICANT CHANGE UP
BASOPHILS # BLD AUTO: 0.03 K/UL — SIGNIFICANT CHANGE UP (ref 0–0.2)
BASOPHILS NFR BLD AUTO: 0.2 % — SIGNIFICANT CHANGE UP (ref 0–2)
BILIRUB SERPL-MCNC: 0.4 MG/DL — SIGNIFICANT CHANGE UP (ref 0.2–1.2)
BILIRUB UR-MCNC: NEGATIVE — SIGNIFICANT CHANGE UP
BLD GP AB SCN SERPL QL: NEGATIVE — SIGNIFICANT CHANGE UP
BLOOD UR QL VISUAL: HIGH
BUN SERPL-MCNC: 5 MG/DL — LOW (ref 7–23)
CALCIUM SERPL-MCNC: 9.1 MG/DL — SIGNIFICANT CHANGE UP (ref 8.4–10.5)
CHLORIDE SERPL-SCNC: 102 MMOL/L — SIGNIFICANT CHANGE UP (ref 98–107)
CO2 SERPL-SCNC: 19 MMOL/L — LOW (ref 22–31)
COLOR SPEC: SIGNIFICANT CHANGE UP
CREAT ?TM UR-MCNC: 23.1 MG/DL — SIGNIFICANT CHANGE UP
CREAT SERPL-MCNC: 0.58 MG/DL — SIGNIFICANT CHANGE UP (ref 0.5–1.3)
EOSINOPHIL # BLD AUTO: 0.12 K/UL — SIGNIFICANT CHANGE UP (ref 0–0.5)
EOSINOPHIL NFR BLD AUTO: 1 % — SIGNIFICANT CHANGE UP (ref 0–6)
FIBRINOGEN PPP-MCNC: 643 MG/DL — HIGH (ref 350–510)
FIBRINOGEN PPP-MCNC: 782.6 MG/DL — HIGH (ref 350–510)
GLUCOSE SERPL-MCNC: 164 MG/DL — HIGH (ref 70–99)
GLUCOSE UR-MCNC: 50 — SIGNIFICANT CHANGE UP
HCT VFR BLD CALC: 27.4 % — LOW (ref 34.5–45)
HCT VFR BLD CALC: 33.9 % — LOW (ref 34.5–45)
HCT VFR BLD CALC: 36 % — SIGNIFICANT CHANGE UP (ref 34.5–45)
HGB BLD-MCNC: 10.7 G/DL — LOW (ref 11.5–15.5)
HGB BLD-MCNC: 11.7 G/DL — SIGNIFICANT CHANGE UP (ref 11.5–15.5)
HGB BLD-MCNC: 9.2 G/DL — LOW (ref 11.5–15.5)
HYALINE CASTS # UR AUTO: NEGATIVE — SIGNIFICANT CHANGE UP
IMM GRANULOCYTES NFR BLD AUTO: 0.9 % — SIGNIFICANT CHANGE UP (ref 0–1.5)
INR BLD: 0.92 — SIGNIFICANT CHANGE UP (ref 0.88–1.17)
INR BLD: 1.01 — SIGNIFICANT CHANGE UP (ref 0.88–1.17)
KETONES UR-MCNC: NEGATIVE — SIGNIFICANT CHANGE UP
LDH SERPL L TO P-CCNC: 161 U/L — SIGNIFICANT CHANGE UP (ref 135–225)
LEUKOCYTE ESTERASE UR-ACNC: SIGNIFICANT CHANGE UP
LYMPHOCYTES # BLD AUTO: 1.81 K/UL — SIGNIFICANT CHANGE UP (ref 1–3.3)
LYMPHOCYTES # BLD AUTO: 14.8 % — SIGNIFICANT CHANGE UP (ref 13–44)
MAGNESIUM SERPL-MCNC: 5.1 MG/DL — HIGH (ref 1.6–2.6)
MCHC RBC-ENTMCNC: 29.1 PG — SIGNIFICANT CHANGE UP (ref 27–34)
MCHC RBC-ENTMCNC: 29.3 PG — SIGNIFICANT CHANGE UP (ref 27–34)
MCHC RBC-ENTMCNC: 29.6 PG — SIGNIFICANT CHANGE UP (ref 27–34)
MCHC RBC-ENTMCNC: 31.6 % — LOW (ref 32–36)
MCHC RBC-ENTMCNC: 32.5 % — SIGNIFICANT CHANGE UP (ref 32–36)
MCHC RBC-ENTMCNC: 33.6 % — SIGNIFICANT CHANGE UP (ref 32–36)
MCV RBC AUTO: 88.1 FL — SIGNIFICANT CHANGE UP (ref 80–100)
MCV RBC AUTO: 89.6 FL — SIGNIFICANT CHANGE UP (ref 80–100)
MCV RBC AUTO: 92.9 FL — SIGNIFICANT CHANGE UP (ref 80–100)
MONOCYTES # BLD AUTO: 0.69 K/UL — SIGNIFICANT CHANGE UP (ref 0–0.9)
MONOCYTES NFR BLD AUTO: 5.7 % — SIGNIFICANT CHANGE UP (ref 2–14)
NEUTROPHILS # BLD AUTO: 9.43 K/UL — HIGH (ref 1.8–7.4)
NEUTROPHILS NFR BLD AUTO: 77.4 % — HIGH (ref 43–77)
NITRITE UR-MCNC: NEGATIVE — SIGNIFICANT CHANGE UP
NRBC # FLD: 0 K/UL — SIGNIFICANT CHANGE UP (ref 0–0)
PH UR: 6.5 — SIGNIFICANT CHANGE UP (ref 5–8)
PLATELET # BLD AUTO: 155 K/UL — SIGNIFICANT CHANGE UP (ref 150–400)
PLATELET # BLD AUTO: 174 K/UL — SIGNIFICANT CHANGE UP (ref 150–400)
PLATELET # BLD AUTO: 206 K/UL — SIGNIFICANT CHANGE UP (ref 150–400)
PMV BLD: 9.2 FL — SIGNIFICANT CHANGE UP (ref 7–13)
PMV BLD: 9.5 FL — SIGNIFICANT CHANGE UP (ref 7–13)
PMV BLD: SIGNIFICANT CHANGE UP FL (ref 7–13)
POTASSIUM SERPL-MCNC: 3.7 MMOL/L — SIGNIFICANT CHANGE UP (ref 3.5–5.3)
POTASSIUM SERPL-SCNC: 3.7 MMOL/L — SIGNIFICANT CHANGE UP (ref 3.5–5.3)
PROT SERPL-MCNC: 6.5 G/DL — SIGNIFICANT CHANGE UP (ref 6–8.3)
PROT UR-MCNC: 9.6 MG/DL — SIGNIFICANT CHANGE UP
PROT UR-MCNC: NEGATIVE — SIGNIFICANT CHANGE UP
PROTHROM AB SERPL-ACNC: 10.5 SEC — SIGNIFICANT CHANGE UP (ref 9.8–13.1)
PROTHROM AB SERPL-ACNC: 11.2 SEC — SIGNIFICANT CHANGE UP (ref 9.8–13.1)
RBC # BLD: 3.11 M/UL — LOW (ref 3.8–5.2)
RBC # BLD: 3.65 M/UL — LOW (ref 3.8–5.2)
RBC # BLD: 4.02 M/UL — SIGNIFICANT CHANGE UP (ref 3.8–5.2)
RBC # FLD: 16.6 % — HIGH (ref 10.3–14.5)
RBC # FLD: 16.7 % — HIGH (ref 10.3–14.5)
RBC # FLD: 16.7 % — HIGH (ref 10.3–14.5)
RBC CASTS # UR COMP ASSIST: HIGH (ref 0–?)
RH IG SCN BLD-IMP: POSITIVE — SIGNIFICANT CHANGE UP
SODIUM SERPL-SCNC: 135 MMOL/L — SIGNIFICANT CHANGE UP (ref 135–145)
SP GR SPEC: 1.01 — SIGNIFICANT CHANGE UP (ref 1–1.04)
SQUAMOUS # UR AUTO: SIGNIFICANT CHANGE UP
T PALLIDUM AB TITR SER: NEGATIVE — SIGNIFICANT CHANGE UP
URATE SERPL-MCNC: 3.8 MG/DL — SIGNIFICANT CHANGE UP (ref 2.5–7)
UROBILINOGEN FLD QL: NORMAL — SIGNIFICANT CHANGE UP
WBC # BLD: 12.19 K/UL — HIGH (ref 3.8–10.5)
WBC # BLD: 16.13 K/UL — HIGH (ref 3.8–10.5)
WBC # BLD: 17.17 K/UL — HIGH (ref 3.8–10.5)
WBC # FLD AUTO: 12.19 K/UL — HIGH (ref 3.8–10.5)
WBC # FLD AUTO: 16.13 K/UL — HIGH (ref 3.8–10.5)
WBC # FLD AUTO: 17.17 K/UL — HIGH (ref 3.8–10.5)
WBC UR QL: HIGH (ref 0–?)

## 2019-12-09 RX ORDER — DIPHENHYDRAMINE HCL 50 MG
25 CAPSULE ORAL EVERY 6 HOURS
Refills: 0 | Status: DISCONTINUED | OUTPATIENT
Start: 2019-12-09 | End: 2019-12-11

## 2019-12-09 RX ORDER — HYDROCORTISONE 1 %
1 OINTMENT (GRAM) TOPICAL EVERY 6 HOURS
Refills: 0 | Status: DISCONTINUED | OUTPATIENT
Start: 2019-12-09 | End: 2019-12-11

## 2019-12-09 RX ORDER — MAGNESIUM HYDROXIDE 400 MG/1
30 TABLET, CHEWABLE ORAL
Refills: 0 | Status: DISCONTINUED | OUTPATIENT
Start: 2019-12-09 | End: 2019-12-11

## 2019-12-09 RX ORDER — GLYCERIN ADULT
1 SUPPOSITORY, RECTAL RECTAL AT BEDTIME
Refills: 0 | Status: DISCONTINUED | OUTPATIENT
Start: 2019-12-09 | End: 2019-12-11

## 2019-12-09 RX ORDER — MAGNESIUM SULFATE 500 MG/ML
1.5 VIAL (ML) INJECTION
Qty: 40 | Refills: 0 | Status: DISCONTINUED | OUTPATIENT
Start: 2019-12-09 | End: 2019-12-10

## 2019-12-09 RX ORDER — TETANUS TOXOID, REDUCED DIPHTHERIA TOXOID AND ACELLULAR PERTUSSIS VACCINE, ADSORBED 5; 2.5; 8; 8; 2.5 [IU]/.5ML; [IU]/.5ML; UG/.5ML; UG/.5ML; UG/.5ML
0.5 SUSPENSION INTRAMUSCULAR ONCE
Refills: 0 | Status: DISCONTINUED | OUTPATIENT
Start: 2019-12-09 | End: 2019-12-11

## 2019-12-09 RX ORDER — SODIUM CHLORIDE 9 MG/ML
1000 INJECTION, SOLUTION INTRAVENOUS
Refills: 0 | Status: DISCONTINUED | OUTPATIENT
Start: 2019-12-09 | End: 2019-12-11

## 2019-12-09 RX ORDER — LABETALOL HCL 100 MG
20 TABLET ORAL ONCE
Refills: 0 | Status: COMPLETED | OUTPATIENT
Start: 2019-12-09 | End: 2019-12-09

## 2019-12-09 RX ORDER — MAGNESIUM SULFATE 500 MG/ML
2 VIAL (ML) INJECTION
Qty: 40 | Refills: 0 | Status: DISCONTINUED | OUTPATIENT
Start: 2019-12-09 | End: 2019-12-09

## 2019-12-09 RX ORDER — OXYTOCIN 10 UNIT/ML
2 VIAL (ML) INJECTION
Qty: 30 | Refills: 0 | Status: DISCONTINUED | OUTPATIENT
Start: 2019-12-09 | End: 2019-12-09

## 2019-12-09 RX ORDER — SODIUM CHLORIDE 9 MG/ML
1000 INJECTION, SOLUTION INTRAVENOUS
Refills: 0 | Status: DISCONTINUED | OUTPATIENT
Start: 2019-12-09 | End: 2019-12-09

## 2019-12-09 RX ORDER — OXYTOCIN 10 UNIT/ML
41.67 VIAL (ML) INJECTION
Qty: 20 | Refills: 0 | Status: DISCONTINUED | OUTPATIENT
Start: 2019-12-09 | End: 2019-12-10

## 2019-12-09 RX ORDER — DIBUCAINE 1 %
1 OINTMENT (GRAM) RECTAL EVERY 6 HOURS
Refills: 0 | Status: DISCONTINUED | OUTPATIENT
Start: 2019-12-09 | End: 2019-12-11

## 2019-12-09 RX ORDER — PRAMOXINE HYDROCHLORIDE 150 MG/15G
1 AEROSOL, FOAM RECTAL EVERY 4 HOURS
Refills: 0 | Status: DISCONTINUED | OUTPATIENT
Start: 2019-12-09 | End: 2019-12-11

## 2019-12-09 RX ORDER — SODIUM CHLORIDE 9 MG/ML
500 INJECTION, SOLUTION INTRAVENOUS ONCE
Refills: 0 | Status: COMPLETED | OUTPATIENT
Start: 2019-12-09 | End: 2019-12-09

## 2019-12-09 RX ORDER — ACETAMINOPHEN 500 MG
975 TABLET ORAL
Refills: 0 | Status: DISCONTINUED | OUTPATIENT
Start: 2019-12-09 | End: 2019-12-11

## 2019-12-09 RX ORDER — AER TRAVELER 0.5 G/1
1 SOLUTION RECTAL; TOPICAL EVERY 4 HOURS
Refills: 0 | Status: DISCONTINUED | OUTPATIENT
Start: 2019-12-09 | End: 2019-12-11

## 2019-12-09 RX ORDER — OXYCODONE HYDROCHLORIDE 5 MG/1
5 TABLET ORAL ONCE
Refills: 0 | Status: DISCONTINUED | OUTPATIENT
Start: 2019-12-09 | End: 2019-12-11

## 2019-12-09 RX ORDER — MAGNESIUM SULFATE 500 MG/ML
4 VIAL (ML) INJECTION ONCE
Refills: 0 | Status: COMPLETED | OUTPATIENT
Start: 2019-12-09 | End: 2019-12-09

## 2019-12-09 RX ORDER — SODIUM CHLORIDE 9 MG/ML
3 INJECTION INTRAMUSCULAR; INTRAVENOUS; SUBCUTANEOUS EVERY 8 HOURS
Refills: 0 | Status: DISCONTINUED | OUTPATIENT
Start: 2019-12-09 | End: 2019-12-11

## 2019-12-09 RX ORDER — SODIUM CHLORIDE 9 MG/ML
1000 INJECTION, SOLUTION INTRAVENOUS ONCE
Refills: 0 | Status: COMPLETED | OUTPATIENT
Start: 2019-12-09 | End: 2019-12-09

## 2019-12-09 RX ORDER — KETOROLAC TROMETHAMINE 30 MG/ML
30 SYRINGE (ML) INJECTION ONCE
Refills: 0 | Status: DISCONTINUED | OUTPATIENT
Start: 2019-12-09 | End: 2019-12-09

## 2019-12-09 RX ORDER — LANOLIN
1 OINTMENT (GRAM) TOPICAL EVERY 6 HOURS
Refills: 0 | Status: DISCONTINUED | OUTPATIENT
Start: 2019-12-09 | End: 2019-12-11

## 2019-12-09 RX ORDER — IBUPROFEN 200 MG
600 TABLET ORAL EVERY 6 HOURS
Refills: 0 | Status: COMPLETED | OUTPATIENT
Start: 2019-12-09 | End: 2020-11-06

## 2019-12-09 RX ORDER — OXYCODONE HYDROCHLORIDE 5 MG/1
5 TABLET ORAL
Refills: 0 | Status: DISCONTINUED | OUTPATIENT
Start: 2019-12-09 | End: 2019-12-11

## 2019-12-09 RX ORDER — BENZOCAINE 10 %
1 GEL (GRAM) MUCOUS MEMBRANE EVERY 6 HOURS
Refills: 0 | Status: DISCONTINUED | OUTPATIENT
Start: 2019-12-09 | End: 2019-12-11

## 2019-12-09 RX ORDER — SIMETHICONE 80 MG/1
80 TABLET, CHEWABLE ORAL EVERY 4 HOURS
Refills: 0 | Status: DISCONTINUED | OUTPATIENT
Start: 2019-12-09 | End: 2019-12-11

## 2019-12-09 RX ADMIN — Medication 30 MILLIGRAM(S): at 15:35

## 2019-12-09 RX ADMIN — SODIUM CHLORIDE 3 MILLILITER(S): 9 INJECTION INTRAMUSCULAR; INTRAVENOUS; SUBCUTANEOUS at 15:00

## 2019-12-09 RX ADMIN — OXYCODONE HYDROCHLORIDE 5 MILLIGRAM(S): 5 TABLET ORAL at 16:22

## 2019-12-09 RX ADMIN — SODIUM CHLORIDE 50 MILLILITER(S): 9 INJECTION, SOLUTION INTRAVENOUS at 10:41

## 2019-12-09 RX ADMIN — Medication 37.5 GM/HR: at 22:12

## 2019-12-09 RX ADMIN — SODIUM CHLORIDE 1000 MILLILITER(S): 9 INJECTION, SOLUTION INTRAVENOUS at 21:09

## 2019-12-09 RX ADMIN — OXYCODONE HYDROCHLORIDE 5 MILLIGRAM(S): 5 TABLET ORAL at 22:30

## 2019-12-09 RX ADMIN — Medication 20 MILLIGRAM(S): at 06:55

## 2019-12-09 RX ADMIN — OXYCODONE HYDROCHLORIDE 5 MILLIGRAM(S): 5 TABLET ORAL at 16:53

## 2019-12-09 RX ADMIN — Medication 125 MILLIUNIT(S)/MIN: at 17:13

## 2019-12-09 RX ADMIN — Medication 975 MILLIGRAM(S): at 16:13

## 2019-12-09 RX ADMIN — Medication 200 GRAM(S): at 07:04

## 2019-12-09 RX ADMIN — SODIUM CHLORIDE 1000 MILLILITER(S): 9 INJECTION, SOLUTION INTRAVENOUS at 13:00

## 2019-12-09 RX ADMIN — Medication 2 MILLIUNIT(S)/MIN: at 10:41

## 2019-12-09 RX ADMIN — Medication 975 MILLIGRAM(S): at 21:09

## 2019-12-09 RX ADMIN — Medication 50 GM/HR: at 07:29

## 2019-12-09 RX ADMIN — SODIUM CHLORIDE 125 MILLILITER(S): 9 INJECTION, SOLUTION INTRAVENOUS at 17:23

## 2019-12-09 RX ADMIN — Medication 30 MILLIGRAM(S): at 15:05

## 2019-12-09 NOTE — DISCHARGE NOTE OB - CARE PROVIDER_API CALL
Bettie Bolanos (MD)  Obstetrics and Gynecology Obstetrics and Gynocology  [address] [place] [address]  Phone: [phone]  Fax: [phone]  Follow Up Time:

## 2019-12-09 NOTE — OB NEONATOLOGY/PEDIATRICIAN DELIVERY SUMMARY - NSPEDSNEONOTESA_OBGYN_ALL_OB_FT
Called to Delivery by Dr. Bellamy for MEC. This is a 38 4/7 week male infant born to a 35 y/o , 0+, prenatal labs unremarkable, GBS unknown ( -neg on 10/31) with pre-pregnancy HTN  and h/o shortened cervix and on progesterone. Past OB history of fetal demise 2017@ 36 week with no clear etiology per [facility].   c/o decreased fetal movement with back pain and spotting today.      - admitted at 29 EGA, s/p Mag and Betamethasone  OBGYN History: 3/28/2017 fetal distress noted, brought to OR, no fetal heart rate, vaginal delivery of stillbirth at 36 EGA Called to Delivery by Dr. Bellamy for MEC. This is a 38 4/7 week male infant born to a 35 y/o , 0+, prenatal labs unremarkable, GBS unknown (-neg on 10/31) with pre-pregnancy HTN and h/o shortened cervix on progesterone with prior admission at 29 weeks treated with Mg and Betamethasone. Past OB history of fetal demise 3/2017@ 36 week with no clear etiology per [facility]. Past medical history of Hyperlipidemia. Mother reported decreased fetal movement, back pain, and spotting today. AROM with Meconium @0900 ~ 2.5 hours PTD. Infant emerged vigorous and crying. w,d,s,s. Apgars 9,9. EOS 0.12. Interested in Hep B, Declines Circumcision, interested in breast feeding. Souleymane Harvey. Called to Delivery by Dr. Bellamy for MEC. This is a 38 4/7 week male infant born to a 35 y/o , 0+, prenatal labs unremarkable, GBS unknown (-neg on 10/31) with pre-pregnancy HTN and h/o shortened cervix on progesterone with prior admission at 29 weeks treated with Mg and Betamethasone. Past OB history of fetal demise 3/2017@ 36 week with no clear etiology per [facility]. Past medical history of Hyperlipidemia. Mother reported decreased fetal movement, back pain, and spotting today. AROM with Meconium @0900 ~ 2.5 hours PTD. Delivered via . Infant emerged vigorous and crying. w,d,s,s. Apgars 9,9. EOS 0.12. Interested in Hep B, Declines Circumcision, interested in breast feeding. Souleymane Harvey.

## 2019-12-09 NOTE — PROVIDER CONTACT NOTE (OTHER) - SITUATION
OB WAQAR Barth notified of Pt's tachycardia. Pt s/p  on Mg this AM. EBL at delivery 400. Mg d/c at 12:55 for Hypotension and 1L bolus given at that time. H/H at 1258 was 10.7/33.9.

## 2019-12-09 NOTE — PROVIDER CONTACT NOTE (OTHER) - BACKGROUND
In PACU, pt unable to stand for Orthostatic BP. Pt felt dizzy. BP 90s/50s, HR 110s. PA Notified and in to assess pt. CBC ordered.

## 2019-12-09 NOTE — CHART NOTE - NSCHARTNOTEFT_GEN_A_CORE
Patient is 34y  status post  at 9am ebl 400 with 2nd deg lac. prenatal course complicated by siPEC previously on magnesium sulfate. Magnesium sulfate was held since 1pm due to low BPs n PACU. Patient evaluated       Patient denies dizziness/lightheadedness, nausea/vomiting, fever/chills, cp/sob,   Denies HA, blurry vision, RUQ/epigastric pain, new onset swelling.     Vital Signs Last 24 Hrs  T(C): 36.9 (09 Dec 2019 17:00), Max: 37.2 (09 Dec 2019 14:30)  T(F): 98.4 (09 Dec 2019 17:00), Max: 99 (09 Dec 2019 14:30)  HR: 105 (09 Dec 2019 20:00) (92 - 129)  BP: 109/70 (09 Dec 2019 20:00) (69/38 - 222/137)  BP(mean): 77 (09 Dec 2019 20:00) (64 - 93)  RR: 20 (09 Dec 2019 19:00) (15 - 20)  SpO2: 100% (09 Dec 2019 20:30) (74% - 100%)    I&O's Detail    09 Dec 2019 07:01  -  09 Dec 2019 21:16  --------------------------------------------------------  IN:    lactated ringers.: 200 mL    lactated ringers.: 1000 mL    magnesium sulfate  Infusion: 250 mL  Total IN: 1450 mL    OUT:    Estimated Blood Loss: 400 mL    Indwelling Catheter - Urethral: 400 mL    Intermittent Catheterization - Urethral: 200 mL    Voided: 1400 mL  Total OUT: 2400 mL    Total NET: -950 mL          PE:  Gen: Appears comfortable  CV: RR s1s2  Pulm: CTA b/l  Abd: Soft, appropriately tender, no rebound  Ext: NT b/l    Labs:                        9.2    16.13 )-----------( 174      ( 09 Dec 2019 15:57 )             27.4                         10.7   17.17 )-----------( 155      ( 09 Dec 2019 12:58 )             33.9                         11.7   12.19 )-----------( 206      ( 09 Dec 2019 06:30 )             36.0         135  |  102  |  5<L>  ----------------------------<  164<H>  3.7   |  19<L>  |  0.58    Ca    9.1      09 Dec 2019 06:30  Mg     5.1         TPro  6.5  /  Alb  3.1<L>  /  TBili  0.4  /  DBili  x   /  AST  17  /  ALT  12  /  AlkPhos  197<H>      PT/INR - ( 09 Dec 2019 12:58 )   PT: 10.5 SEC;   INR: 0.92          PTT - ( 09 Dec 2019 12:58 )  PTT:19.6 SEC    Fibrinogen: Fibrinogen Assay: 643.0 mg/dL ( @ 12:58)  Fibrinogen Assay: 782.6 mg/dL ( @ 06:30)      Lactate:     MEDICATIONS  (STANDING):  acetaminophen   Tablet .. 975 milliGRAM(s) Oral <User Schedule>  diphtheria/tetanus/pertussis (acellular) Vaccine (ADAcel) 0.5 milliLiter(s) IntraMuscular once  ibuprofen  Tablet. 600 milliGRAM(s) Oral every 6 hours  lactated ringers. 1000 milliLiter(s) (125 mL/Hr) IV Continuous <Continuous>  oxytocin Infusion 41.667 milliUNIT(s)/Min (125 mL/Hr) IV Continuous <Continuous>  prenatal multivitamin 1 Tablet(s) Oral daily  sodium chloride 0.9% lock flush 3 milliLiter(s) IV Push every 8 hours      Assessment:    Differential Dx :    Plan:           ELLI Lutz PGY3  ------------------------------------------------------------------------------------------------------------- Patient is 34y  status post  at 9am ebl 400 with 2nd deg lac. prenatal course complicated by sPEC previously on magnesium sulfate. Magnesium sulfate was held since 1pm due to low BPs in PACU. Patient previously failed to complete orthostatic VS as she complained of dizziness with standing.     Patient seen and evaluated at bedside. Patient resting comfortably in bed without acute complaints. She has not been out of bed postoperatively due to complaint of dizziness with sitting up and standing.  At this time patient denies feeling dizzy or lightheaded. She feels improved from earlier now that she has eaten and the magnesium sulfate is off. Pain is well controlled. She denies nausea/vomiting, fever/chills, cp/sob. Denies HA, blurry vision, RUQ/epigastric pain, new onset swelling. Patient is voiding and UOP is adequate.     Vital Signs Last 24 Hrs  T(C): 36.9 (09 Dec 2019 17:00), Max: 37.2 (09 Dec 2019 14:30)  T(F): 98.4 (09 Dec 2019 17:00), Max: 99 (09 Dec 2019 14:30)  HR: 105 (09 Dec 2019 20:00) (92 - 129)  BP: 109/70 (09 Dec 2019 20:00) (69/38 - 222/137)  BP(mean): 77 (09 Dec 2019 20:00) (64 - 93)  RR: 20 (09 Dec 2019 19:00) (15 - 20)  SpO2: 100% (09 Dec 2019 20:30) (74% - 100%)    I&O's Detail    09 Dec 2019 07:01  -  09 Dec 2019 21:16  --------------------------------------------------------  IN:    lactated ringers.: 200 mL    lactated ringers.: 1000 mL    magnesium sulfate  Infusion: 250 mL  Total IN: 1450 mL    OUT:    Estimated Blood Loss: 400 mL    Indwelling Catheter - Urethral: 400 mL    Intermittent Catheterization - Urethral: 200 mL    Voided: 1400 mL  Total OUT: 2400 mL    Total NET: -950 mL          PE:  Gen: Appears comfortable  CV: RR s1s2  Pulm: CTA b/l  Abd: Soft, appropriately tender, no rebound  Ext: NT b/l  BSS: Lung sono clear, no b-lines     Labs:                        9.2    16.13 )-----------( 174      ( 09 Dec 2019 15:57 )             27.4                         10.7   17.17 )-----------( 155      ( 09 Dec 2019 12:58 )             33.9                         11.7   12.19 )-----------( 206      ( 09 Dec 2019 06:30 )             36.0         135  |  102  |  5<L>  ----------------------------<  164<H>  3.7   |  19<L>  |  0.58    Ca    9.1      09 Dec 2019 06:30  Mg     5.1         TPro  6.5  /  Alb  3.1<L>  /  TBili  0.4  /  DBili  x   /  AST  17  /  ALT  12  /  AlkPhos  197<H>      PT/INR - ( 09 Dec 2019 12:58 )   PT: 10.5 SEC;   INR: 0.92          PTT - ( 09 Dec 2019 12:58 )  PTT:19.6 SEC    Fibrinogen: Fibrinogen Assay: 643.0 mg/dL ( @ 12:58)  Fibrinogen Assay: 782.6 mg/dL ( @ 06:30)      Lactate:     MEDICATIONS  (STANDING):  acetaminophen   Tablet .. 975 milliGRAM(s) Oral <User Schedule>  diphtheria/tetanus/pertussis (acellular) Vaccine (ADAcel) 0.5 milliLiter(s) IntraMuscular once  ibuprofen  Tablet. 600 milliGRAM(s) Oral every 6 hours  lactated ringers. 1000 milliLiter(s) (125 mL/Hr) IV Continuous <Continuous>  oxytocin Infusion 41.667 milliUNIT(s)/Min (125 mL/Hr) IV Continuous <Continuous>  prenatal multivitamin 1 Tablet(s) Oral daily  sodium chloride 0.9% lock flush 3 milliLiter(s) IV Push every 8 hours      Assessment/Plan: 34y  status post  with sPEC previously on magnesium sulfate. Magnesium sulfate was held since 1pm due to low BPs in PACU.   -Plan to restart magnesium sulfate at 1.5g/h given previously low BPs on magnesium. Pt to remain in PACU while restarting magnesium sulfate to monitor BPs  -UOP adequate, pt status post 1.5L boluses throughout day.   -orthostatics negative   -HELLP labs in AM          d/w Dr. Jatin Lutz PGY3  -------------------------------------------------------------------------------------------------------------

## 2019-12-09 NOTE — OB PROVIDER H&P - PROBLEM SELECTOR PLAN 1
Evidence of  severed PEC and labor.  - admit to labor and delivery  - for Labetalol IVP, Magnesium Sulfate   - HELLP labs pending  -  en route to hospital

## 2019-12-09 NOTE — OB PROVIDER H&P - NSHPPHYSICALEXAM_GEN_ALL_CORE
Vital Signs Last 24 Hrs  T(C): 37 (09 Dec 2019 06:16), Max: 37 (09 Dec 2019 06:16)  T(F): 98.6 (09 Dec 2019 06:16), Max: 98.6 (09 Dec 2019 06:16)  HR: 118 (09 Dec 2019 06:21) (118 - 129)  BP: 133/87 (09 Dec 2019 06:21) (133/87 - 140/95)  RR: 18 (09 Dec 2019 06:16) (18 - 18)  SpO2: 98% (09 Dec 2019 06:13) (98% - 98%)  FHR: 150 baseline, minimum variability, no accelerations, no decelerations  CTX: 2 minutes apart  TAS: cephalic presentation  EFW: 3200

## 2019-12-09 NOTE — OB PROVIDER TRIAGE NOTE - NS_OBGYNHISTORY_OBGYN_ALL_OB_FT
OBGYN History: 3/28/2017 fetal distress noted, STAT c/s, stillbirth at 36 EGA, post partum complicated by elevated blood pressure OBGYN History: 3/28/2017 fetal distress noted, brought to OR, no fetal heart rate, vaginal delivery of stillbirth at 36 EGA, post partum complicated by elevated blood pressure

## 2019-12-09 NOTE — OB PROVIDER H&P - NS_OBGYNHISTORY_OBGYN_ALL_OB_FT
OBGYN History: 3/28/2017 fetal distress noted, brought to OR, no fetal heart rate, vaginal delivery of stillbirth at 36 EGA, post partum complicated by elevated blood pressure

## 2019-12-09 NOTE — DISCHARGE NOTE OB - ADDITIONAL INSTRUCTIONS
Follow up in 2 weeks for blood pressure check and 6 weeks for postpartum visit.    Please check BP three times a day at home. Call office for BP >140/90.

## 2019-12-09 NOTE — DISCHARGE NOTE OB - PATIENT PORTAL LINK FT
You can access the FollowMyHealth Patient Portal offered by [facility] by registering at the following website: [email or web address]. By joining [facility]’s FollowMyHealth portal, you will also be able to view your health information using other applications (apps) compatible with our system.

## 2019-12-09 NOTE — OB RN DELIVERY SUMMARY - NS_SEPSISRSKCALC_OBGYN_ALL_OB_FT
EOS calculated successfully. EOS Risk Factor: 0.5/1000 live births ([facility] national incidence); GA=38w4d; Temp=98.78; ROM=2.533; GBS='Negative'; Antibiotics='No antibiotics or any antibiotics < 2 hrs prior to birth'

## 2019-12-09 NOTE — OB PROVIDER TRIAGE NOTE - HISTORY OF PRESENT ILLNESS
's patient is a 33 y/o EDC 12/19/2019 EGA 38 4/7 reports of no fetal movement. Patient denies cramping, loss of fluid, vaginal bleeding. Patient scheduled for IOL of labor on 12/12. Patient history significant for stillbirth in 2017. Patient reports blood pressure elevated in post partum and stillbirth, no etiology as per [facility] reports by patient. GBS status: Negative  10/31/2019    Patient initial blood pressure 140/95, denies PEC symptoms reported .    Medications: iron, asa, pnv  Allergies: NKA    AP complications:  - Shorten cervix, on Progesterone until 36 EGA  - admitted at 29 EGA, s/p Mag and Betamethasone  Medical History: CHTN, no medications   Surgical History: c/s  OBGYN History: 3/28/2017 fetal distress noted, STAT c/s, stillbirth at 36 EGA, post partum complicated by elevated blood pressure 's patient is a 33 y/o EDC 12/19/2019 EGA 38 4/7 reports of no fetal movement, back pain and spotting . Patient denies cramping, loss of fluid.. Patient scheduled for IOL of labor on 12/12. Patient history significant for stillbirth in 2017. Patient reports blood pressure elevated in post partum and stillbirth, no etiology as per [facility] reports by patient. GBS status: Negative  10/31/2019    Patient initial blood pressure 140/95, denies PEC symptoms reported .    Medications: iron, asa, pnv  Allergies: NKA    AP complications:  - Shorten cervix, on Progesterone until 36 EGA  - admitted at 29 EGA, s/p Mag and Betamethasone  Medical History: CHTN, no medications   Surgical History: c/s  OBGYN History: 3/28/2017 fetal distress noted, brought to OR, no fetal heart rate, vaginal delivery of stillbirth at 36 EGA, post partum complicated by elevated blood pressure    Vital Signs Last 24 Hrs  T(C): 37 (09 Dec 2019 06:16), Max: 37 (09 Dec 2019 06:16)  T(F): 98.6 (09 Dec 2019 06:16), Max: 98.6 (09 Dec 2019 06:16)  HR: 118 (09 Dec 2019 06:21) (118 - 129)  BP: 133/87 (09 Dec 2019 06:21) (133/87 - 140/95)  RR: 18 (09 Dec 2019 06:16) (18 - 18)  SpO2: 98% (09 Dec 2019 06:13) (98% - 98%)  FHR: 150 baseline, minimum variability, no accelerations, no decelerations  CTX: 2 minutes apart  TAS: cephalic presentation  EFW: 3200

## 2019-12-09 NOTE — OB PROVIDER H&P - ASSESSMENT
's patient is a 33 y/o EDC 12/19/2019 EGA 38 4/7 reports of no fetal movement, back pain and spotting . Patient denies cramping, loss of fluid.. Patient scheduled for IOL of labor on 12/12. Patient history significant for stillbirth in 2017. Patient reports blood pressure elevated in post partum and stillbirth, no etiology as per [facility] reports by patient. GBS status: Negative  10/31/2019    Patient initial blood pressure 140/95, denies PEC symptoms reported .    Medications: iron, asa, pnv  Allergies: NKA    AP complications:  - Shorten cervix, on Progesterone until 36 EGA  - admitted at 29 EGA, s/p Mag and Betamethasone  Medical History: CHTN, no medications   Surgical History: c/s  OBGYN History: 3/28/2017 fetal distress noted, brought to OR, no fetal heart rate, vaginal delivery of stillbirth at 36 EGA, post partum complicated by elevated blood pressure    Vital Signs Last 24 Hrs  T(C): 37 (09 Dec 2019 06:16), Max: 37 (09 Dec 2019 06:16)  T(F): 98.6 (09 Dec 2019 06:16), Max: 98.6 (09 Dec 2019 06:16)  HR: 118 (09 Dec 2019 06:21) (118 - 129)  BP: 133/87 (09 Dec 2019 06:21) (133/87 - 140/95)  RR: 18 (09 Dec 2019 06:16) (18 - 18)  SpO2: 98% (09 Dec 2019 06:13) (98% - 98%)  FHR: 150 baseline, minimum variability, no accelerations, no decelerations  CTX: 2 minutes apart  TAS: cephalic presentation  EFW: 3200    Blood pressures critically elevated:   164/103  at 0642-  notified,  notified  160/124 at 0652-  notified,  notified, Labetalol 20 mg IVP, Magnesium Sulfate to be started    Evidence of  severed PEC and labor.  - admit to labor and delivery  - for Labetalol IVP, Magnesium Sulfate   - HELLP labs pending  -  en route to hospital

## 2019-12-09 NOTE — CHART NOTE - NSCHARTNOTEFT_GEN_A_CORE
Patient BP noted to read as 69/38 , patient seen immediately at bedside. Patient sitting up in bed, reports feeling mildly dizzy, otherwise feeling fine. IV bolus started, magnesium held. BPs repeated 81/50 . Fundus firm, pad with light staining, no active bleeding. Anesthesia called to room to evaluate patient. CBC, coags, magnesium level drawn. BP repeated and improved 90/56, then improved to 121/60. Patient reports feeling better, dizziness resolved. Hypotension likely secondary to hypovolemia and underestimated EBL. Will finish 1L bolus, continue to hold magnesium. Follow up labs.

## 2019-12-09 NOTE — DISCHARGE NOTE OB - USE THE 5 A'S (ASK, ADVISE, ASSESS, ASSIST, ARRANGE)
Patient discharged to home, with family/grand-daughter, leg bag placed, teaching for this acknowledged by patient and grand-daughter. Instructions reviewed, patient states understanding. Informed patient she is to schedule nurse visit for Tuesday, 5/9/2017 for voiding trial, patient states understanding.     Statement Selected

## 2019-12-09 NOTE — OB PROVIDER DELIVERY SUMMARY - NSPROVIDERDELIVERYNOTE_OBGYN_ALL_OB_FT
Patient found to be fully dilated and +1 station. Directed to push with contractions. Delivered a viable male . Cord clamped and cut after 45 seconds. Cord gas obtained. Placenta delivered spontaneously. Mild uterine atony was noted, improved with bimanual massage. A periurethral laceration was briskly bleeding and repaired with 3-0 chromic. after a straight catheter was placed. A second degree perineal laceration was repaired. EBL 400cc.

## 2019-12-09 NOTE — OB PROVIDER TRIAGE NOTE - NSHPPHYSICALEXAM_GEN_ALL_CORE
Vital Signs Last 24 Hrs  T(C): 37 (09 Dec 2019 06:16), Max: 37 (09 Dec 2019 06:16)  T(F): 98.6 (09 Dec 2019 06:16), Max: 98.6 (09 Dec 2019 06:16)  HR: 118 (09 Dec 2019 06:21) (118 - 129)  BP: 133/87 (09 Dec 2019 06:21) (133/87 - 140/95)  RR: 18 (09 Dec 2019 06:16) (18 - 18)  SpO2: 98% (09 Dec 2019 06:13) (98% - 98%)  FHR:  CTX:  TAS:  EFW: Vital Signs Last 24 Hrs  T(C): 37 (09 Dec 2019 06:16), Max: 37 (09 Dec 2019 06:16)  T(F): 98.6 (09 Dec 2019 06:16), Max: 98.6 (09 Dec 2019 06:16)  HR: 118 (09 Dec 2019 06:21) (118 - 129)  BP: 133/87 (09 Dec 2019 06:21) (133/87 - 140/95)  RR: 18 (09 Dec 2019 06:16) (18 - 18)  SpO2: 98% (09 Dec 2019 06:13) (98% - 98%)  FHR: 150 baseline, minimum variability, no accelerations, no decelerations  CTX: 2 minutes apart  TAS: cephalic presentation  EFW: 3200

## 2019-12-09 NOTE — CHART NOTE - NSCHARTNOTEFT_GEN_A_CORE
Patient seen and examined at bedside. Noted to have low BPs after epidural, 75/42, repeat 80/47. Anesthesia called to bedside for evaluation. Patient asymptomatic. BP repeated 105/66. No pressors given. Will given 200cc. bolus. VE: 6/80/-2/AROM meconium, IUPC placed. Tracing reviewed, minimal-moderate variability, no decels. Contractions not picking up on tocometer, will evaluate contractions now with IUPC. If no change with next exam will start pitocin if Cat 1 tracing.

## 2019-12-09 NOTE — OB PROVIDER H&P - HISTORY OF PRESENT ILLNESS
's patient is a 35 y/o EDC 12/19/2019 EGA 38 4/7 reports of no fetal movement, back pain and spotting . Patient denies cramping, loss of fluid.. Patient scheduled for IOL of labor on 12/12. Patient history significant for stillbirth in 2017. Patient reports blood pressure elevated in post partum and stillbirth, no etiology as per [facility] reports by patient. GBS status: Negative  10/31/2019    Patient initial blood pressure 140/95, denies PEC symptoms reported .    Medications: iron, asa, pnv  Allergies: NKA    AP complications:  - Shorten cervix, on Progesterone until 36 EGA  - admitted at 29 EGA, s/p Mag and Betamethasone  Medical History: CHTN, no medications   Surgical History: c/s  OBGYN History: 3/28/2017 fetal distress noted, brought to OR, no fetal heart rate, vaginal delivery of stillbirth at 36 EGA, post partum complicated by elevated blood pressure    Vital Signs Last 24 Hrs  T(C): 37 (09 Dec 2019 06:16), Max: 37 (09 Dec 2019 06:16)  T(F): 98.6 (09 Dec 2019 06:16), Max: 98.6 (09 Dec 2019 06:16)  HR: 118 (09 Dec 2019 06:21) (118 - 129)  BP: 133/87 (09 Dec 2019 06:21) (133/87 - 140/95)  RR: 18 (09 Dec 2019 06:16) (18 - 18)  SpO2: 98% (09 Dec 2019 06:13) (98% - 98%)  FHR: 150 baseline, minimum variability, no accelerations, no decelerations  CTX: 2 minutes apart  TAS: cephalic presentation  EFW: 3200

## 2019-12-09 NOTE — DISCHARGE NOTE OB - CARE PLAN
Principal Discharge DX:	Normal vaginal delivery  Goal:	Postpartum recovery  Assessment and plan of treatment:	routine postpartum care

## 2019-12-09 NOTE — OB PROVIDER TRIAGE NOTE - NS_GESTAGE_OBGYN_ALL_OB_FT
----- Message from Jose Luis Mace M.D. sent at 2/3/2017  9:18 AM PST -----  Shows herniated disc- refer to neurosurgery- ordered  
Phone Number Called: [phone] (home)     Message: Patient notified of results and verbalized understanding.    Left Message for patient to call back: no        
Phone Number Called: [phone] (home)       Message: called regarding message below.    Left Message for patient to call back: yes        
38w4d

## 2019-12-09 NOTE — OB RN PATIENT PROFILE - PRO BLOOD TYPE INFANT
Subjective:       Patient ID: Nicole Lala is a 85 y.o. White female who presents for return patient evaluation for chronic renal failure.    She was just in [facility] for cellulitis and is seeing Dr. Hernandez twice a week.  She recently was placed on clindamycin.  Her torsemide was held when she was in the hospital and she has not resumed it yet.    Review of Systems   Constitutional: Negative for appetite change, chills and fever.   Eyes: Negative for visual disturbance.   Respiratory: Positive for shortness of breath (with exertion). Negative for cough.    Cardiovascular: Positive for leg swelling. Negative for chest pain and palpitations.   Gastrointestinal: Negative for abdominal pain, diarrhea, nausea and vomiting.   Genitourinary: Negative for difficulty urinating, dysuria and hematuria.   Musculoskeletal: Positive for arthralgias (knees, B hip) and gait problem. Negative for myalgias.   Skin: Negative for rash.   Neurological: Negative for headaches.   Hematological: Bruises/bleeds easily.   Psychiatric/Behavioral: Negative for sleep disturbance.       The past medical, family and social histories were reviewed for this encounter.     /66   Pulse 84   LMP  (LMP Unknown) Comment: post menopausal  SpO2 96%     Objective:      Physical Exam   Constitutional: She appears well-developed and well-nourished. No distress.   HENT:   Head: Normocephalic and atraumatic.   Eyes: Conjunctivae are normal. No scleral icterus.   Neck: Normal range of motion. No JVD present.   Cardiovascular: Normal rate, regular rhythm and normal heart sounds.  Exam reveals no gallop and no friction rub.    No murmur heard.  Decreased tones with irregularly irregular rhythm   Pulmonary/Chest: Effort normal and breath sounds normal. No respiratory distress. She has no wheezes.   Abdominal: Soft. Bowel sounds are normal. She exhibits no distension. There is no tenderness.   Musculoskeletal: She exhibits edema.    Skin: Skin is warm and dry. No rash noted.   Psychiatric: She has a normal mood and affect.   Vitals reviewed.      Assessment:       1. CKD (chronic kidney disease) stage 3, GFR 30-59 ml/min    2. Lymphedema    3. Hyperpotassemia        Plan:   Return to clinic on 12/28.  She already has an appointment.  Labs for next visit include rp, pth. She gets her labs in [place].  Baseline creatinine is 1.2-1.6 since 2013.  PTH is 210 with a calcium of 9.6.  Her function is labile. She does not seem to have an active disease which is affecting her renal function however her cardiac function may be implicated in her labile function. She has pulmonary HTN in the past and this would explain both her edema and a labile kidney function.  We discussed a low potassium diet again.  Restart torsemide.   O positive

## 2019-12-10 LAB
ALBUMIN SERPL ELPH-MCNC: 2.2 G/DL — LOW (ref 3.3–5)
ALP SERPL-CCNC: 125 U/L — HIGH (ref 40–120)
ALT FLD-CCNC: 11 U/L — SIGNIFICANT CHANGE UP (ref 4–33)
ANION GAP SERPL CALC-SCNC: 8 MMO/L — SIGNIFICANT CHANGE UP (ref 7–14)
APTT BLD: 24.5 SEC — LOW (ref 27.5–36.3)
AST SERPL-CCNC: 16 U/L — SIGNIFICANT CHANGE UP (ref 4–32)
BILIRUB SERPL-MCNC: < 0.2 MG/DL — LOW (ref 0.2–1.2)
BUN SERPL-MCNC: 6 MG/DL — LOW (ref 7–23)
CALCIUM SERPL-MCNC: 7.3 MG/DL — LOW (ref 8.4–10.5)
CHLORIDE SERPL-SCNC: 102 MMOL/L — SIGNIFICANT CHANGE UP (ref 98–107)
CO2 SERPL-SCNC: 23 MMOL/L — SIGNIFICANT CHANGE UP (ref 22–31)
CREAT SERPL-MCNC: 0.55 MG/DL — SIGNIFICANT CHANGE UP (ref 0.5–1.3)
FIBRINOGEN PPP-MCNC: 610 MG/DL — HIGH (ref 350–510)
GLUCOSE SERPL-MCNC: 134 MG/DL — HIGH (ref 70–99)
HCT VFR BLD CALC: 21.1 % — LOW (ref 34.5–45)
HGB BLD-MCNC: 7 G/DL — CRITICAL LOW (ref 11.5–15.5)
INR BLD: 0.97 — SIGNIFICANT CHANGE UP (ref 0.88–1.17)
LDH SERPL L TO P-CCNC: 168 U/L — SIGNIFICANT CHANGE UP (ref 135–225)
MAGNESIUM SERPL-MCNC: 3 MG/DL — HIGH (ref 1.6–2.6)
MAGNESIUM SERPL-MCNC: 4 MG/DL — HIGH (ref 1.6–2.6)
MCHC RBC-ENTMCNC: 30 PG — SIGNIFICANT CHANGE UP (ref 27–34)
MCHC RBC-ENTMCNC: 33.2 % — SIGNIFICANT CHANGE UP (ref 32–36)
MCV RBC AUTO: 90.6 FL — SIGNIFICANT CHANGE UP (ref 80–100)
NRBC # FLD: 0 K/UL — SIGNIFICANT CHANGE UP (ref 0–0)
PLATELET # BLD AUTO: 187 K/UL — SIGNIFICANT CHANGE UP (ref 150–400)
PMV BLD: 9 FL — SIGNIFICANT CHANGE UP (ref 7–13)
POTASSIUM SERPL-MCNC: 3.8 MMOL/L — SIGNIFICANT CHANGE UP (ref 3.5–5.3)
POTASSIUM SERPL-SCNC: 3.8 MMOL/L — SIGNIFICANT CHANGE UP (ref 3.5–5.3)
PROT SERPL-MCNC: 4.7 G/DL — LOW (ref 6–8.3)
PROTHROM AB SERPL-ACNC: 10.8 SEC — SIGNIFICANT CHANGE UP (ref 9.8–13.1)
RBC # BLD: 2.33 M/UL — LOW (ref 3.8–5.2)
RBC # FLD: 16.9 % — HIGH (ref 10.3–14.5)
SODIUM SERPL-SCNC: 133 MMOL/L — LOW (ref 135–145)
URATE SERPL-MCNC: 3.7 MG/DL — SIGNIFICANT CHANGE UP (ref 2.5–7)
WBC # BLD: 11.73 K/UL — HIGH (ref 3.8–10.5)
WBC # FLD AUTO: 11.73 K/UL — HIGH (ref 3.8–10.5)

## 2019-12-10 RX ORDER — FERROUS SULFATE 325(65) MG
325 TABLET ORAL THREE TIMES A DAY
Refills: 0 | Status: DISCONTINUED | OUTPATIENT
Start: 2019-12-10 | End: 2019-12-11

## 2019-12-10 RX ORDER — ASCORBIC ACID 60 MG
500 TABLET,CHEWABLE ORAL DAILY
Refills: 0 | Status: DISCONTINUED | OUTPATIENT
Start: 2019-12-10 | End: 2019-12-11

## 2019-12-10 RX ORDER — IBUPROFEN 200 MG
600 TABLET ORAL EVERY 6 HOURS
Refills: 0 | Status: DISCONTINUED | OUTPATIENT
Start: 2019-12-10 | End: 2019-12-11

## 2019-12-10 RX ADMIN — Medication 37.5 GM/HR: at 07:26

## 2019-12-10 RX ADMIN — AER TRAVELER 1 APPLICATION(S): 0.5 SOLUTION RECTAL; TOPICAL at 20:35

## 2019-12-10 RX ADMIN — Medication 975 MILLIGRAM(S): at 21:33

## 2019-12-10 RX ADMIN — OXYCODONE HYDROCHLORIDE 5 MILLIGRAM(S): 5 TABLET ORAL at 01:21

## 2019-12-10 RX ADMIN — Medication 975 MILLIGRAM(S): at 20:33

## 2019-12-10 RX ADMIN — Medication 600 MILLIGRAM(S): at 23:10

## 2019-12-10 RX ADMIN — OXYCODONE HYDROCHLORIDE 5 MILLIGRAM(S): 5 TABLET ORAL at 04:51

## 2019-12-10 RX ADMIN — SODIUM CHLORIDE 3 MILLILITER(S): 9 INJECTION INTRAMUSCULAR; INTRAVENOUS; SUBCUTANEOUS at 16:16

## 2019-12-10 RX ADMIN — Medication 975 MILLIGRAM(S): at 05:36

## 2019-12-10 RX ADMIN — Medication 500 MILLIGRAM(S): at 16:16

## 2019-12-10 RX ADMIN — Medication 975 MILLIGRAM(S): at 04:52

## 2019-12-10 RX ADMIN — Medication 975 MILLIGRAM(S): at 12:45

## 2019-12-10 RX ADMIN — SODIUM CHLORIDE 3 MILLILITER(S): 9 INJECTION INTRAMUSCULAR; INTRAVENOUS; SUBCUTANEOUS at 06:06

## 2019-12-10 RX ADMIN — OXYCODONE HYDROCHLORIDE 5 MILLIGRAM(S): 5 TABLET ORAL at 01:57

## 2019-12-10 RX ADMIN — Medication 325 MILLIGRAM(S): at 22:14

## 2019-12-10 RX ADMIN — OXYCODONE HYDROCHLORIDE 5 MILLIGRAM(S): 5 TABLET ORAL at 09:00

## 2019-12-10 RX ADMIN — OXYCODONE HYDROCHLORIDE 5 MILLIGRAM(S): 5 TABLET ORAL at 08:00

## 2019-12-10 RX ADMIN — Medication 1 APPLICATION(S): at 20:34

## 2019-12-10 RX ADMIN — Medication 325 MILLIGRAM(S): at 16:16

## 2019-12-10 RX ADMIN — Medication 37.5 GM/HR: at 01:01

## 2019-12-10 RX ADMIN — OXYCODONE HYDROCHLORIDE 5 MILLIGRAM(S): 5 TABLET ORAL at 05:37

## 2019-12-10 RX ADMIN — SODIUM CHLORIDE 3 MILLILITER(S): 9 INJECTION INTRAMUSCULAR; INTRAVENOUS; SUBCUTANEOUS at 22:14

## 2019-12-10 RX ADMIN — Medication 975 MILLIGRAM(S): at 13:45

## 2019-12-10 RX ADMIN — PRAMOXINE HYDROCHLORIDE 1 APPLICATION(S): 150 AEROSOL, FOAM RECTAL at 20:34

## 2019-12-10 NOTE — PROGRESS NOTE ADULT - ASSESSMENT
Assessment and Plan  PPD #1 s/p   Doing well, bonding well with baby  Encourage ambulation.  PP & PPD Instructions reviewed.  D/C home tomorrow.

## 2019-12-10 NOTE — PROVIDER CONTACT NOTE (OTHER) - ASSESSMENT
Lab called with critical lab value. H&H 7.0/21.1. Vital signs stable. Pt asymptomatic. No c/o voiced

## 2019-12-10 NOTE — PROGRESS NOTE ADULT - SUBJECTIVE AND OBJECTIVE BOX
OB Progress Note:  PPD#1    S: 35yo  PPD#1 s/p  complicate by super imposed preeclampsia wht severe features, currently on Mg. Patient feels well. Pain is well controlled. She is tolerating a regular diet and passing flatus. She is voiding spontaneously, and ambulating without difficulty. Denies CP/SOB. Denies lightheadedness/dizziness. Denies N/V. Denies headaches, blurry vision, epigastric pain. Denies heavy vagianl bleeding.     O:  Vitals:  Vital Signs Last 24 Hrs  T(C): 36.8 (10 Dec 2019 05:33), Max: 37.2 (09 Dec 2019 14:30)  T(F): 98.2 (10 Dec 2019 05:33), Max: 99 (09 Dec 2019 14:30)  HR: 102 (10 Dec 2019 05:33) (92 - 123)  BP: 117/55 (10 Dec 2019 05:33) (69/38 - 222/137)  BP(mean): 86 (09 Dec 2019 23:30) (64 - 93)  RR: 18 (10 Dec 2019 05:33) (16 - 20)  SpO2: 99% (10 Dec 2019 05:33) (74% - 100%)    MEDICATIONS  (STANDING):  acetaminophen   Tablet .. 975 milliGRAM(s) Oral <User Schedule>  ascorbic acid 500 milliGRAM(s) Oral daily  diphtheria/tetanus/pertussis (acellular) Vaccine (ADAcel) 0.5 milliLiter(s) IntraMuscular once  ferrous    sulfate 325 milliGRAM(s) Oral three times a day  ibuprofen  Tablet. 600 milliGRAM(s) Oral every 6 hours  lactated ringers. 1000 milliLiter(s) (87.5 mL/Hr) IV Continuous <Continuous>  magnesium sulfate Infusion 1.5 Gm/Hr (37.5 mL/Hr) IV Continuous <Continuous>  prenatal multivitamin 1 Tablet(s) Oral daily  sodium chloride 0.9% lock flush 3 milliLiter(s) IV Push every 8 hours      Labs:  Blood type: O Positive  Rubella IgG: RPR: Negative                          7.0<LL>   11.73<H> >-----------< 187    ( 12-10 @ 04:50 )             21.1<L>                        9.2<L>   16.13<H> >-----------< 174    (  @ 15:57 )             27.4<L>                              Physical Exam:  General: NAD  Abdomen: soft, non-tender, non-distended, fundus firm  Vaginal: Lochia wnl  Extremities: No erythema/edema

## 2019-12-10 NOTE — LACTATION INITIAL EVALUATION - LACTATION INTERVENTIONS
initiate skin to skin/initiate hand expression routine/Demonstrated positions.  No latch obtained at this time .  Encouraged frequent breastfeeding attempts, with assistance as needed instead of formula.

## 2019-12-10 NOTE — PROGRESS NOTE ADULT - PROBLEM SELECTOR PLAN 1
- Conitnue Mg 24 hours postpartum; monitor BPs q 4 hours   - Pain well controlled, continue current pain regimen  - Increase ambulation, SCDs when not ambulating  - Continue regular diet    Ray Last PGY1

## 2019-12-10 NOTE — PROGRESS NOTE ADULT - SUBJECTIVE AND OBJECTIVE BOX
Post-partum Note,   She is a  34y woman who is now post-partum day: 1    Subjective:  The patient feels well.  She is ambulating.   She is tolerating regular diet.  She denies nausea and vomiting; denies fever.  She is voiding.  Her pain is controlled.  She reports normal postpartum bleeding.  She is breastfeeding.    Physical exam:    Vital Signs Last 24 Hrs  T(C): 36.7 (10 Dec 2019 10:00), Max: 37.2 (09 Dec 2019 14:30)  T(F): 98.1 (10 Dec 2019 10:00), Max: 99 (09 Dec 2019 14:30)  HR: 101 (10 Dec 2019 10:00) (96 - 123)  BP: 118/74 (10 Dec 2019 10:00) (69/38 - 222/137)  BP(mean): 86 (09 Dec 2019 23:30) (64 - 93)  RR: 17 (10 Dec 2019 10:00) (16 - 20)  SpO2: 100% (10 Dec 2019 10:00) (74% - 100%)    Gen: NAD  Breast: Soft, nontender, not engorged.  Abdomen: Soft, nontender, no distension , firm uterine fundus at umbilicus.  Pelvic: Normal lochia noted  Ext: No calf tenderness    LABS:                        7.0    11.73 )-----------( 187      ( 10 Dec 2019 04:50 )             21.1       Allergies    No Known Allergies      MEDICATIONS  (STANDING):  acetaminophen   Tablet .. 975 milliGRAM(s) Oral <User Schedule>  ascorbic acid 500 milliGRAM(s) Oral daily  diphtheria/tetanus/pertussis (acellular) Vaccine (ADAcel) 0.5 milliLiter(s) IntraMuscular once  ferrous    sulfate 325 milliGRAM(s) Oral three times a day  ibuprofen  Tablet. 600 milliGRAM(s) Oral every 6 hours  lactated ringers. 1000 milliLiter(s) (87.5 mL/Hr) IV Continuous <Continuous>  prenatal multivitamin 1 Tablet(s) Oral daily  sodium chloride 0.9% lock flush 3 milliLiter(s) IV Push every 8 hours    MEDICATIONS  (PRN):  benzocaine 20%/menthol 0.5% Spray 1 Spray(s) Topical every 6 hours PRN for Perineal discomfort  dibucaine 1% Ointment 1 Application(s) Topical every 6 hours PRN Perineal discomfort  diphenhydrAMINE 25 milliGRAM(s) Oral every 6 hours PRN Pruritus  glycerin Suppository - Adult 1 Suppository(s) Rectal at bedtime PRN Constipation  hydrocortisone 1% Cream 1 Application(s) Topical every 6 hours PRN Moderate Pain (4-6)  lanolin Ointment 1 Application(s) Topical every 6 hours PRN nipple soreness  magnesium hydroxide Suspension 30 milliLiter(s) Oral two times a day PRN Constipation  oxyCODONE    IR 5 milliGRAM(s) Oral every 3 hours PRN Moderate to Severe Pain (4-10)  oxyCODONE    IR 5 milliGRAM(s) Oral once PRN Moderate to Severe Pain (4-10)  pramoxine 1%/zinc 5% Cream 1 Application(s) Topical every 4 hours PRN Moderate Pain (4-6)  simethicone 80 milliGRAM(s) Chew every 4 hours PRN Gas  witch hazel Pads 1 Application(s) Topical every 4 hours PRN Perineal discomfort

## 2019-12-10 NOTE — PROGRESS NOTE ADULT - SUBJECTIVE AND OBJECTIVE BOX
ANESTHESIA POST-EPIDURAL CHECK    34y Female s/p  DAY 1     No COMPLAINTS    NO APPARENT ANESTHESIA COMPLICATIONS

## 2019-12-11 VITALS
SYSTOLIC BLOOD PRESSURE: 120 MMHG | RESPIRATION RATE: 18 BRPM | TEMPERATURE: 98 F | OXYGEN SATURATION: 100 % | HEART RATE: 113 BPM | DIASTOLIC BLOOD PRESSURE: 72 MMHG

## 2019-12-11 LAB
BASOPHILS # BLD AUTO: 0.02 K/UL — SIGNIFICANT CHANGE UP (ref 0–0.2)
BASOPHILS NFR BLD AUTO: 0.2 % — SIGNIFICANT CHANGE UP (ref 0–2)
EOSINOPHIL # BLD AUTO: 0.12 K/UL — SIGNIFICANT CHANGE UP (ref 0–0.5)
EOSINOPHIL NFR BLD AUTO: 1.2 % — SIGNIFICANT CHANGE UP (ref 0–6)
HCT VFR BLD CALC: 21.6 % — LOW (ref 34.5–45)
HGB BLD-MCNC: 7.1 G/DL — LOW (ref 11.5–15.5)
IMM GRANULOCYTES NFR BLD AUTO: 1.9 % — HIGH (ref 0–1.5)
LYMPHOCYTES # BLD AUTO: 1.57 K/UL — SIGNIFICANT CHANGE UP (ref 1–3.3)
LYMPHOCYTES # BLD AUTO: 15.7 % — SIGNIFICANT CHANGE UP (ref 13–44)
MCHC RBC-ENTMCNC: 30.2 PG — SIGNIFICANT CHANGE UP (ref 27–34)
MCHC RBC-ENTMCNC: 32.9 % — SIGNIFICANT CHANGE UP (ref 32–36)
MCV RBC AUTO: 91.9 FL — SIGNIFICANT CHANGE UP (ref 80–100)
MONOCYTES # BLD AUTO: 0.81 K/UL — SIGNIFICANT CHANGE UP (ref 0–0.9)
MONOCYTES NFR BLD AUTO: 8.1 % — SIGNIFICANT CHANGE UP (ref 2–14)
NEUTROPHILS # BLD AUTO: 7.27 K/UL — SIGNIFICANT CHANGE UP (ref 1.8–7.4)
NEUTROPHILS NFR BLD AUTO: 72.9 % — SIGNIFICANT CHANGE UP (ref 43–77)
NRBC # FLD: 0 K/UL — SIGNIFICANT CHANGE UP (ref 0–0)
PLATELET # BLD AUTO: 169 K/UL — SIGNIFICANT CHANGE UP (ref 150–400)
PMV BLD: 9.3 FL — SIGNIFICANT CHANGE UP (ref 7–13)
RBC # BLD: 2.35 M/UL — LOW (ref 3.8–5.2)
RBC # FLD: 16.9 % — HIGH (ref 10.3–14.5)
WBC # BLD: 9.98 K/UL — SIGNIFICANT CHANGE UP (ref 3.8–10.5)
WBC # FLD AUTO: 9.98 K/UL — SIGNIFICANT CHANGE UP (ref 3.8–10.5)

## 2019-12-11 PROCEDURE — 93010 ELECTROCARDIOGRAM REPORT: CPT

## 2019-12-11 RX ORDER — ACETAMINOPHEN 500 MG
3 TABLET ORAL
Qty: 0 | Refills: 0 | DISCHARGE
Start: 2019-12-11

## 2019-12-11 RX ORDER — FERROUS SULFATE 325(65) MG
1 TABLET ORAL
Qty: 0 | Refills: 0 | DISCHARGE

## 2019-12-11 RX ORDER — IBUPROFEN 200 MG
1 TABLET ORAL
Qty: 0 | Refills: 0 | DISCHARGE
Start: 2019-12-11

## 2019-12-11 RX ADMIN — Medication 325 MILLIGRAM(S): at 06:25

## 2019-12-11 RX ADMIN — Medication 500 MILLIGRAM(S): at 13:15

## 2019-12-11 RX ADMIN — Medication 600 MILLIGRAM(S): at 13:15

## 2019-12-11 RX ADMIN — Medication 325 MILLIGRAM(S): at 13:15

## 2019-12-11 RX ADMIN — Medication 600 MILLIGRAM(S): at 06:24

## 2019-12-11 RX ADMIN — Medication 600 MILLIGRAM(S): at 06:54

## 2019-12-11 RX ADMIN — SODIUM CHLORIDE 3 MILLILITER(S): 9 INJECTION INTRAMUSCULAR; INTRAVENOUS; SUBCUTANEOUS at 06:26

## 2019-12-11 RX ADMIN — Medication 975 MILLIGRAM(S): at 19:54

## 2019-12-11 RX ADMIN — Medication 1 TABLET(S): at 13:15

## 2019-12-11 RX ADMIN — Medication 600 MILLIGRAM(S): at 00:10

## 2019-12-11 RX ADMIN — Medication 600 MILLIGRAM(S): at 14:00

## 2019-12-11 NOTE — PROGRESS NOTE ADULT - PROBLEM SELECTOR PLAN 1
- Pain well controlled, continue current pain regimen  - Increase ambulation, SCDs when not ambulating  - Continue regular diet  - Discharge planning     Ray Last PGY1

## 2019-12-11 NOTE — PROGRESS NOTE ADULT - ATTENDING COMMENTS
pt with persistent tachycardia and anemia. offered blood transfusion to which patient declined. pt stated she would only accept transfusion in the case of an emergency. EKG performed showing sinus tachycardia. pt expressed understanding of risks of anemia and prefers to decline transfusion as she feels ok and is not experiencing symptoms. pt ok for discharge

## 2019-12-11 NOTE — PROGRESS NOTE ADULT - SUBJECTIVE AND OBJECTIVE BOX
OB Progress Note:  PPD#2    S: 35yo PPD#2 s/p , pregnancy c/b superimposed preeclampsia w/ severe features, s/p Mg. Patient feels well. Denies headaches, blurry vision, epigastric pain, excessive swelling. Pain is well controlled. She is tolerating a regular diet and passing flatus. She is voiding spontaneously, and ambulating without difficulty. Denies CP/SOB. Denies lightheadedness/dizziness. Denies N/V. Denies heavy vaginal bleeding.     O:  Vitals:   Vital Signs Last 24 Hrs  T(C): 36.9 (11 Dec 2019 07:01), Max: 37.1 (10 Dec 2019 19:23)  T(F): 98.5 (11 Dec 2019 07:01), Max: 98.8 (10 Dec 2019 19:23)  HR: 107 (11 Dec 2019 07:01) (101 - 108)  BP: 127/56 (11 Dec 2019 07:01) (118/74 - 127/56)  BP(mean): --  RR: 18 (11 Dec 2019 07:01) (17 - 18)  SpO2: 98% (11 Dec 2019 07:01) (98% - 100%)    MEDICATIONS  (STANDING):  acetaminophen   Tablet .. 975 milliGRAM(s) Oral <User Schedule>  ascorbic acid 500 milliGRAM(s) Oral daily  diphtheria/tetanus/pertussis (acellular) Vaccine (ADAcel) 0.5 milliLiter(s) IntraMuscular once  ferrous    sulfate 325 milliGRAM(s) Oral three times a day  ibuprofen  Tablet. 600 milliGRAM(s) Oral every 6 hours  lactated ringers. 1000 milliLiter(s) (87.5 mL/Hr) IV Continuous <Continuous>  prenatal multivitamin 1 Tablet(s) Oral daily  sodium chloride 0.9% lock flush 3 milliLiter(s) IV Push every 8 hours    MEDICATIONS  (PRN):  benzocaine 20%/menthol 0.5% Spray 1 Spray(s) Topical every 6 hours PRN for Perineal discomfort  dibucaine 1% Ointment 1 Application(s) Topical every 6 hours PRN Perineal discomfort  diphenhydrAMINE 25 milliGRAM(s) Oral every 6 hours PRN Pruritus  glycerin Suppository - Adult 1 Suppository(s) Rectal at bedtime PRN Constipation  hydrocortisone 1% Cream 1 Application(s) Topical every 6 hours PRN Moderate Pain (4-6)  lanolin Ointment 1 Application(s) Topical every 6 hours PRN nipple soreness  magnesium hydroxide Suspension 30 milliLiter(s) Oral two times a day PRN Constipation  oxyCODONE    IR 5 milliGRAM(s) Oral every 3 hours PRN Moderate to Severe Pain (4-10)  oxyCODONE    IR 5 milliGRAM(s) Oral once PRN Moderate to Severe Pain (4-10)  pramoxine 1%/zinc 5% Cream 1 Application(s) Topical every 4 hours PRN Moderate Pain (4-6)  simethicone 80 milliGRAM(s) Chew every 4 hours PRN Gas  witch hazel Pads 1 Application(s) Topical every 4 hours PRN Perineal discomfort      Labs:  Blood type: O Positive  Rubella IgG: RPR: Negative                          7.1<L>   9.98 >-----------< 169    (  @ 07:07 )             21.6<L>                        7.0<LL>   11.73<H> >-----------< 187    ( 12-10 @ 04:50 )             21.1<L>                               Physical Exam:  General: NAD  Abdomen: soft, non-tender, non-distended, fundus firm  Vaginal: Lochia wnl  Extremities: No erythema/edema

## 2019-12-13 DIAGNOSIS — O26.873 CERVICAL SHORTENING, THIRD TRIMESTER: ICD-10-CM

## 2019-12-13 DIAGNOSIS — O60.03 PRETERM LABOR WITHOUT DELIVERY, THIRD TRIMESTER: ICD-10-CM

## 2019-12-13 DIAGNOSIS — O09.293 SUPERVISION OF PREGNANCY WITH OTHER POOR REPRODUCTIVE OR OBSTETRIC HISTORY, THIRD TRIMESTER: ICD-10-CM

## 2019-12-14 ENCOUNTER — OUTPATIENT (OUTPATIENT)
Dept: INPATIENT UNIT | Facility: HOSPITAL | Age: 34
LOS: 1 days | End: 2019-12-14

## 2019-12-14 VITALS
SYSTOLIC BLOOD PRESSURE: 121 MMHG | DIASTOLIC BLOOD PRESSURE: 76 MMHG | RESPIRATION RATE: 17 BRPM | HEART RATE: 102 BPM | TEMPERATURE: 98 F

## 2019-12-14 DIAGNOSIS — O16.9 UNSPECIFIED MATERNAL HYPERTENSION, UNSPECIFIED TRIMESTER: ICD-10-CM

## 2019-12-14 LAB
ALBUMIN SERPL ELPH-MCNC: 3.1 G/DL — LOW (ref 3.3–5)
ALP SERPL-CCNC: 125 U/L — HIGH (ref 40–120)
ALT FLD-CCNC: 30 U/L — SIGNIFICANT CHANGE UP (ref 4–33)
ANION GAP SERPL CALC-SCNC: 13 MMO/L — SIGNIFICANT CHANGE UP (ref 7–14)
ANISOCYTOSIS BLD QL: SLIGHT — SIGNIFICANT CHANGE UP
APPEARANCE UR: CLEAR — SIGNIFICANT CHANGE UP
APTT BLD: 24.5 SEC — LOW (ref 27.5–36.3)
AST SERPL-CCNC: 24 U/L — SIGNIFICANT CHANGE UP (ref 4–32)
BACTERIA # UR AUTO: SIGNIFICANT CHANGE UP
BASOPHILS # BLD AUTO: 0.04 K/UL — SIGNIFICANT CHANGE UP (ref 0–0.2)
BASOPHILS NFR BLD AUTO: 0.4 % — SIGNIFICANT CHANGE UP (ref 0–2)
BASOPHILS NFR SPEC: 0.9 % — SIGNIFICANT CHANGE UP (ref 0–2)
BILIRUB SERPL-MCNC: < 0.2 MG/DL — LOW (ref 0.2–1.2)
BILIRUB UR-MCNC: NEGATIVE — SIGNIFICANT CHANGE UP
BLASTS # FLD: 0 % — SIGNIFICANT CHANGE UP (ref 0–0)
BLOOD UR QL VISUAL: HIGH
BUN SERPL-MCNC: 7 MG/DL — SIGNIFICANT CHANGE UP (ref 7–23)
CALCIUM SERPL-MCNC: 8.6 MG/DL — SIGNIFICANT CHANGE UP (ref 8.4–10.5)
CHLORIDE SERPL-SCNC: 107 MMOL/L — SIGNIFICANT CHANGE UP (ref 98–107)
CO2 SERPL-SCNC: 21 MMOL/L — LOW (ref 22–31)
COLOR SPEC: SIGNIFICANT CHANGE UP
CREAT ?TM UR-MCNC: 10.5 MG/DL — SIGNIFICANT CHANGE UP
CREAT SERPL-MCNC: 0.51 MG/DL — SIGNIFICANT CHANGE UP (ref 0.5–1.3)
DACRYOCYTES BLD QL SMEAR: SLIGHT — SIGNIFICANT CHANGE UP
EOSINOPHIL # BLD AUTO: 0.24 K/UL — SIGNIFICANT CHANGE UP (ref 0–0.5)
EOSINOPHIL NFR BLD AUTO: 2.4 % — SIGNIFICANT CHANGE UP (ref 0–6)
EOSINOPHIL NFR FLD: 3.5 % — SIGNIFICANT CHANGE UP (ref 0–6)
FIBRINOGEN PPP-MCNC: 615.4 MG/DL — HIGH (ref 350–510)
GIANT PLATELETS BLD QL SMEAR: PRESENT — SIGNIFICANT CHANGE UP
GLUCOSE SERPL-MCNC: 102 MG/DL — HIGH (ref 70–99)
GLUCOSE UR-MCNC: NEGATIVE — SIGNIFICANT CHANGE UP
HCT VFR BLD CALC: 23.6 % — LOW (ref 34.5–45)
HGB BLD-MCNC: 7.7 G/DL — LOW (ref 11.5–15.5)
HYALINE CASTS # UR AUTO: NEGATIVE — SIGNIFICANT CHANGE UP
HYPOCHROMIA BLD QL: SLIGHT — SIGNIFICANT CHANGE UP
IMM GRANULOCYTES NFR BLD AUTO: 9.7 % — HIGH (ref 0–1.5)
INR BLD: 0.9 — SIGNIFICANT CHANGE UP (ref 0.88–1.17)
KETONES UR-MCNC: NEGATIVE — SIGNIFICANT CHANGE UP
LDH SERPL L TO P-CCNC: 280 U/L — HIGH (ref 135–225)
LEUKOCYTE ESTERASE UR-ACNC: SIGNIFICANT CHANGE UP
LYMPHOCYTES # BLD AUTO: 1.59 K/UL — SIGNIFICANT CHANGE UP (ref 1–3.3)
LYMPHOCYTES # BLD AUTO: 16.1 % — SIGNIFICANT CHANGE UP (ref 13–44)
LYMPHOCYTES NFR SPEC AUTO: 11.6 % — LOW (ref 13–44)
MACROCYTES BLD QL: SLIGHT — SIGNIFICANT CHANGE UP
MCHC RBC-ENTMCNC: 30 PG — SIGNIFICANT CHANGE UP (ref 27–34)
MCHC RBC-ENTMCNC: 32.6 % — SIGNIFICANT CHANGE UP (ref 32–36)
MCV RBC AUTO: 91.8 FL — SIGNIFICANT CHANGE UP (ref 80–100)
METAMYELOCYTES # FLD: 1.8 % — HIGH (ref 0–1)
MICROCYTES BLD QL: SLIGHT — SIGNIFICANT CHANGE UP
MONOCYTES # BLD AUTO: 0.83 K/UL — SIGNIFICANT CHANGE UP (ref 0–0.9)
MONOCYTES NFR BLD AUTO: 8.4 % — SIGNIFICANT CHANGE UP (ref 2–14)
MONOCYTES NFR BLD: 3.6 % — SIGNIFICANT CHANGE UP (ref 2–9)
MYELOCYTES NFR BLD: 1.8 % — HIGH (ref 0–0)
NEUTROPHIL AB SER-ACNC: 71.4 % — SIGNIFICANT CHANGE UP (ref 43–77)
NEUTROPHILS # BLD AUTO: 6.21 K/UL — SIGNIFICANT CHANGE UP (ref 1.8–7.4)
NEUTROPHILS NFR BLD AUTO: 63 % — SIGNIFICANT CHANGE UP (ref 43–77)
NEUTS BAND # BLD: 1.8 % — SIGNIFICANT CHANGE UP (ref 0–6)
NITRITE UR-MCNC: NEGATIVE — SIGNIFICANT CHANGE UP
NRBC # FLD: 0.05 K/UL — SIGNIFICANT CHANGE UP (ref 0–0)
OTHER - HEMATOLOGY %: 0 — SIGNIFICANT CHANGE UP
OVALOCYTES BLD QL SMEAR: SLIGHT — SIGNIFICANT CHANGE UP
PH UR: 6.5 — SIGNIFICANT CHANGE UP (ref 5–8)
PLATELET # BLD AUTO: 252 K/UL — SIGNIFICANT CHANGE UP (ref 150–400)
PLATELET COUNT - ESTIMATE: NORMAL — SIGNIFICANT CHANGE UP
PMV BLD: 8.9 FL — SIGNIFICANT CHANGE UP (ref 7–13)
POIKILOCYTOSIS BLD QL AUTO: SLIGHT — SIGNIFICANT CHANGE UP
POLYCHROMASIA BLD QL SMEAR: SLIGHT — SIGNIFICANT CHANGE UP
POTASSIUM SERPL-MCNC: 4 MMOL/L — SIGNIFICANT CHANGE UP (ref 3.5–5.3)
POTASSIUM SERPL-SCNC: 4 MMOL/L — SIGNIFICANT CHANGE UP (ref 3.5–5.3)
PROMYELOCYTES # FLD: 0 % — SIGNIFICANT CHANGE UP (ref 0–0)
PROT SERPL-MCNC: 6.2 G/DL — SIGNIFICANT CHANGE UP (ref 6–8.3)
PROT UR-MCNC: 9.1 MG/DL — SIGNIFICANT CHANGE UP
PROT UR-MCNC: NEGATIVE — SIGNIFICANT CHANGE UP
PROTHROM AB SERPL-ACNC: 10 SEC — SIGNIFICANT CHANGE UP (ref 9.8–13.1)
RBC # BLD: 2.57 M/UL — LOW (ref 3.8–5.2)
RBC # FLD: 16 % — HIGH (ref 10.3–14.5)
RBC CASTS # UR COMP ASSIST: SIGNIFICANT CHANGE UP (ref 0–?)
REVIEW TO FOLLOW: YES — SIGNIFICANT CHANGE UP
SCHISTOCYTES BLD QL AUTO: SLIGHT — SIGNIFICANT CHANGE UP
SODIUM SERPL-SCNC: 141 MMOL/L — SIGNIFICANT CHANGE UP (ref 135–145)
SP GR SPEC: 1 — SIGNIFICANT CHANGE UP (ref 1–1.04)
SQUAMOUS # UR AUTO: SIGNIFICANT CHANGE UP
URATE SERPL-MCNC: 4.5 MG/DL — SIGNIFICANT CHANGE UP (ref 2.5–7)
UROBILINOGEN FLD QL: NORMAL — SIGNIFICANT CHANGE UP
VARIANT LYMPHS # BLD: 3.6 % — SIGNIFICANT CHANGE UP
WBC # BLD: 9.87 K/UL — SIGNIFICANT CHANGE UP (ref 3.8–10.5)
WBC # FLD AUTO: 9.87 K/UL — SIGNIFICANT CHANGE UP (ref 3.8–10.5)
WBC UR QL: >50 — HIGH (ref 0–?)

## 2019-12-14 NOTE — PROGRESS NOTE ADULT - ASSESSMENT
Dr. Bellamy's pt. is a 33y/o PPD#5 of  with PEC. Pt. reports of swollen feet, BP: 138/90 and 128/89, and a constant headache. Pt. states she took 500mg of Tylenol around 6pm and now no longer has a headache. Pt. denies N/V, visual changes, and  epigastric/RUQ pain.       Medical Hx:  Surgical Hx:   OBGYN Hx:    19 7-4 PEC on Mag.   Fetal Demise VD 2018 PEC  NKDA    Assessment/Plan:  BP:138/90, 128/89, 121/76  HELLP labs pending Dr. Bellamy's pt. is a 33y/o PPD#5 of  with PEC. Pt. reports of swollen feet, BP: 138/90 and 128/89, and a constant headache. Pt. states she took 500mg of Tylenol around 6pm and now no longer has a headache. Pt. denies N/V, visual changes, and  epigastric/RUQ pain.       Medical Hx:  Surgical Hx:   OBGYN Hx:    19 7-4 PEC on Mag.   Fetal Demise VD 2018 PEC  NKDA    Assessment/Plan:  BP: 128/89, 121/76, 124/67, 122/90, 125/83, 133/66, 135/62, 131/60    CBC Full  -  ( 14 Dec 2019 21:30 )  WBC Count : 9.87 K/uL  RBC Count : 2.57 M/uL  Hemoglobin : 7.7 g/dL  Hematocrit : 23.6 %  Platelet Count - Automated : 252 K/uL  Mean Cell Volume : 91.8 fL  Mean Cell Hemoglobin : 30.0 pg  Mean Cell Hemoglobin Concentration : 32.6 %  Auto Neutrophil # : 6.21 K/uL  Auto Lymphocyte # : 1.59 K/uL  Auto Monocyte # : 0.83 K/uL  Auto Eosinophil # : 0.24 K/uL  Auto Basophil # : 0.04 K/uL  Auto Neutrophil % : 63.0 %  Auto Lymphocyte % : 16.1 %  Auto Monocyte % : 8.4 %  Auto Eosinophil % : 2.4 %  Auto Basophil % : 0.4 %        141  |  107  |  7   ----------------------------<  102<H>  4.0   |  21<L>  |  0.51    Ca    8.6      14 Dec 2019 21:30    TPro  6.2  /  Alb  3.1<L>  /  TBili  < 0.2<L>  /  DBili  x   /  AST  24  /  ALT  30  /  AlkPhos  125<H>  12-  Uric Acid:4.5  LDH:  280    Coagulation: PT: 10, INR: 0.9, PTT:24.5, Fibrinogen: 615.4    P:C Ratio: 0.87    Discussed findings with Dr. Wall. Pt. d/c'd home. Pt. to keep Monday's appointment. Pt. PEC signs reviewed with pt. 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 Pt. reports of swollen feet, BP: 138/90 and 128/89, and a constant headache. Pt. states she took 500mg of Tylenol around 6pm and now no longer has a headache. Pt. denies N/V, visual changes, and  epigastric/RUQ pain.       Medical Hx: Denies  Surgical Hx: Denies  OBGYN Hx:    19 7-4 PEC on Mag.   Fetal Demise VD 3/28/2018 PEC  NKDA    Assessment/Plan:  BP: 128/89, 121/76, 124/67, 122/90, 125/83, 133/66, 135/62, 131/60    CBC Full  -  ( 14 Dec 2019 21:30 )  WBC Count : 9.87 K/uL  RBC Count : 2.57 M/uL  Hemoglobin : 7.7 g/dL  Hematocrit : 23.6 %  Platelet Count - Automated : 252 K/uL  Mean Cell Volume : 91.8 fL  Mean Cell Hemoglobin : 30.0 pg  Mean Cell Hemoglobin Concentration : 32.6 %  Auto Neutrophil # : 6.21 K/uL  Auto Lymphocyte # : 1.59 K/uL  Auto Monocyte # : 0.83 K/uL  Auto Eosinophil # : 0.24 K/uL  Auto Basophil # : 0.04 K/uL  Auto Neutrophil % : 63.0 %  Auto Lymphocyte % : 16.1 %  Auto Monocyte % : 8.4 %  Auto Eosinophil % : 2.4 %  Auto Basophil % : 0.4 %        141  |  107  |  7   ----------------------------<  102<H>  4.0   |  21<L>  |  0.51    Ca    8.6      14 Dec 2019 21:30    TPro  6.2  /  Alb  3.1<L>  /  TBili  < 0.2<L>  /  DBili  x   /  AST  24  /  ALT  30  /  AlkPhos  125<H>  -  Uric Acid:4.5  LDH:  280    Coagulation: PT: 10, INR: 0.9, PTT:24.5, Fibrinogen: 615.4    P:C Ratio: 0.87    Discussed findings with Dr. Wall. Pt. d/c'd home. Pt. to keep Monday's appointment. Pt. PEC signs reviewed with pt.

## 2019-12-14 NOTE — PROGRESS NOTE ADULT - SUBJECTIVE AND OBJECTIVE BOX
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 Pt. reports of swollen feet, BP: 138/90 and 128/89, and a constant headache. Pt. states she took 500mg of Tylenol around 6pm and now no longer has a headache. Pt. denies N/V, visual changes, and  epigastric/RUQ pain.       Medical Hx:  Surgical Hx:   OBGYN Hx:    19 7-4 PEC on Mag.   Fetal Demise VD 2018 PEC  NKDA    Assessment/Plan:  BP:138/90, 128/89, 121/76  HELLP labs pending Dr. Bellamy's pt. is a 33y/o PPD#5 of  with PEC. Pt. reports of swollen feet, BP: 138/90 and 128/89, and a constant headache. Pt. states she took 500mg of Tylenol around 6pm and now no longer has a headache. Pt. denies N/V, visual changes, and  epigastric/RUQ pain.       Medical Hx: Denies	  Surgical Hx:   OBGYN Hx:    19 7-4 PEC on Mag.   Fetal Demise VD 2018 PEC  NKDA    Assessment/Plan:  BP:138/90, 128/89, 121/76  HELLP labs pending

## 2019-12-18 DIAGNOSIS — O09.293 SUPERVISION OF PREGNANCY WITH OTHER POOR REPRODUCTIVE OR OBSTETRIC HISTORY, THIRD TRIMESTER: ICD-10-CM

## 2019-12-18 DIAGNOSIS — O60.03 PRETERM LABOR WITHOUT DELIVERY, THIRD TRIMESTER: ICD-10-CM

## 2019-12-18 DIAGNOSIS — O26.873 CERVICAL SHORTENING, THIRD TRIMESTER: ICD-10-CM

## 2019-12-20 DIAGNOSIS — O99.213 OBESITY COMPLICATING PREGNANCY, THIRD TRIMESTER: ICD-10-CM

## 2019-12-20 DIAGNOSIS — Z3A.37 37 WEEKS GESTATION OF PREGNANCY: ICD-10-CM

## 2019-12-20 DIAGNOSIS — O09.293 SUPERVISION OF PREGNANCY WITH OTHER POOR REPRODUCTIVE OR OBSTETRIC HISTORY, THIRD TRIMESTER: ICD-10-CM

## 2019-12-20 DIAGNOSIS — O60.03 PRETERM LABOR WITHOUT DELIVERY, THIRD TRIMESTER: ICD-10-CM

## 2019-12-20 DIAGNOSIS — O26.873 CERVICAL SHORTENING, THIRD TRIMESTER: ICD-10-CM

## 2020-01-01 DIAGNOSIS — Z3A.00 WEEKS OF GESTATION OF PREGNANCY NOT SPECIFIED: ICD-10-CM

## 2020-01-01 DIAGNOSIS — O26.899 OTHER SPECIFIED PREGNANCY RELATED CONDITIONS, UNSPECIFIED TRIMESTER: ICD-10-CM

## 2020-01-03 DIAGNOSIS — O99.213 OBESITY COMPLICATING PREGNANCY, THIRD TRIMESTER: ICD-10-CM

## 2020-01-03 DIAGNOSIS — O09.293 SUPERVISION OF PREGNANCY WITH OTHER POOR REPRODUCTIVE OR OBSTETRIC HISTORY, THIRD TRIMESTER: ICD-10-CM

## 2020-01-03 DIAGNOSIS — Z3A.38 38 WEEKS GESTATION OF PREGNANCY: ICD-10-CM

## 2020-01-03 DIAGNOSIS — O26.873 CERVICAL SHORTENING, THIRD TRIMESTER: ICD-10-CM

## 2021-05-21 ENCOUNTER — APPOINTMENT (OUTPATIENT)
Dept: OBGYN | Facility: CLINIC | Age: 36
End: 2021-05-21

## 2021-07-22 ENCOUNTER — APPOINTMENT (OUTPATIENT)
Dept: ANTEPARTUM | Facility: CLINIC | Age: 36
End: 2021-07-22
Payer: COMMERCIAL

## 2021-07-22 ENCOUNTER — ASOB RESULT (OUTPATIENT)
Age: 36
End: 2021-07-22

## 2021-07-22 PROCEDURE — 99203 OFFICE O/P NEW LOW 30 MIN: CPT | Mod: 95

## 2021-07-26 ENCOUNTER — ASOB RESULT (OUTPATIENT)
Age: 36
End: 2021-07-26

## 2021-08-06 ENCOUNTER — ASOB RESULT (OUTPATIENT)
Age: 36
End: 2021-08-06

## 2021-08-06 ENCOUNTER — APPOINTMENT (OUTPATIENT)
Dept: ANTEPARTUM | Facility: CLINIC | Age: 36
End: 2021-08-06
Payer: COMMERCIAL

## 2021-08-06 PROCEDURE — 76813 OB US NUCHAL MEAS 1 GEST: CPT | Mod: 59

## 2021-08-06 PROCEDURE — 76801 OB US < 14 WKS SINGLE FETUS: CPT

## 2021-08-10 ENCOUNTER — APPOINTMENT (OUTPATIENT)
Dept: MATERNAL FETAL MEDICINE | Facility: CLINIC | Age: 36
End: 2021-08-10
Payer: COMMERCIAL

## 2021-08-10 ENCOUNTER — ASOB RESULT (OUTPATIENT)
Age: 36
End: 2021-08-10

## 2021-08-10 PROCEDURE — 99212 OFFICE O/P EST SF 10 MIN: CPT | Mod: 95

## 2021-08-12 RX ORDER — ASPIRIN 81 MG
81 TABLET, DELAYED RELEASE (ENTERIC COATED) ORAL
Refills: 0 | Status: ACTIVE | COMMUNITY

## 2021-09-01 ENCOUNTER — NON-APPOINTMENT (OUTPATIENT)
Age: 36
End: 2021-09-01

## 2021-09-02 ENCOUNTER — LABORATORY RESULT (OUTPATIENT)
Age: 36
End: 2021-09-02

## 2021-09-02 ENCOUNTER — APPOINTMENT (OUTPATIENT)
Dept: ANTEPARTUM | Facility: CLINIC | Age: 36
End: 2021-09-02
Payer: COMMERCIAL

## 2021-09-02 ENCOUNTER — APPOINTMENT (OUTPATIENT)
Dept: MATERNAL FETAL MEDICINE | Facility: CLINIC | Age: 36
End: 2021-09-02
Payer: COMMERCIAL

## 2021-09-02 ENCOUNTER — ASOB RESULT (OUTPATIENT)
Age: 36
End: 2021-09-02

## 2021-09-02 DIAGNOSIS — O28.5 ABNORMAL CHROMOSOMAL AND GENETIC FINDING ON ANTENATAL SCREENING OF MOTHER: ICD-10-CM

## 2021-09-02 PROCEDURE — 59000 AMNIOCENTESIS DIAGNOSTIC: CPT

## 2021-09-02 PROCEDURE — 76946 ECHO GUIDE FOR AMNIOCENTESIS: CPT

## 2021-09-02 PROCEDURE — ZZZZZ: CPT

## 2021-09-02 PROCEDURE — 36415 COLL VENOUS BLD VENIPUNCTURE: CPT

## 2021-09-03 ENCOUNTER — NON-APPOINTMENT (OUTPATIENT)
Age: 36
End: 2021-09-03

## 2021-09-09 ENCOUNTER — OUTPATIENT (OUTPATIENT)
Dept: INPATIENT UNIT | Facility: HOSPITAL | Age: 36
LOS: 1 days | Discharge: HOME | End: 2021-09-09
Payer: COMMERCIAL

## 2021-09-09 VITALS
SYSTOLIC BLOOD PRESSURE: 137 MMHG | HEART RATE: 92 BPM | DIASTOLIC BLOOD PRESSURE: 87 MMHG | TEMPERATURE: 99 F | RESPIRATION RATE: 16 BRPM

## 2021-09-09 DIAGNOSIS — Z3A.00 WEEKS OF GESTATION OF PREGNANCY NOT SPECIFIED: ICD-10-CM

## 2021-09-09 DIAGNOSIS — O26.899 OTHER SPECIFIED PREGNANCY RELATED CONDITIONS, UNSPECIFIED TRIMESTER: ICD-10-CM

## 2021-09-09 PROCEDURE — 76805 OB US >/= 14 WKS SNGL FETUS: CPT | Mod: 26

## 2021-09-09 PROCEDURE — 99215 OFFICE O/P EST HI 40 MIN: CPT | Mod: 25

## 2021-09-09 PROCEDURE — 76817 TRANSVAGINAL US OBSTETRIC: CPT | Mod: 26

## 2021-09-09 NOTE — OB RN TRIAGE NOTE - NSICDXFAMILYHX_GEN_ALL_CORE_FT
FAMILY HISTORY:  Hypertension  Type 2 diabetes mellitus    Grandparent  Still living? Yes, Estimated age: Age Unknown  Family history of thyroid disease, Age at diagnosis: Age Unknown

## 2021-09-09 NOTE — OB RN TRIAGE NOTE - CURRENT PREGNANCY COMPLICATIONS, OB PROFILE
Incompetent Cervix/Cervical Insufficiency Incompetent Cervix/Cervical Insufficiency/Gestational Age less than 36 Weeks

## 2021-09-09 NOTE — OB RN TRIAGE NOTE - NSICDXPASTMEDICALHX_GEN_ALL_CORE_FT
PAST MEDICAL HISTORY:  Hyperlipidemia     Normal vaginal delivery 3/28/2018 Ft stillborn @36wks    Postpartum hypertension 2018, pt. denies having ever taken anti hypertensive medication

## 2021-09-10 VITALS — SYSTOLIC BLOOD PRESSURE: 126 MMHG | DIASTOLIC BLOOD PRESSURE: 71 MMHG | HEART RATE: 93 BPM

## 2021-09-10 LAB
APPEARANCE UR: CLEAR — SIGNIFICANT CHANGE UP
BILIRUB UR-MCNC: NEGATIVE — SIGNIFICANT CHANGE UP
COLOR SPEC: COLORLESS — SIGNIFICANT CHANGE UP
DIFF PNL FLD: NEGATIVE — SIGNIFICANT CHANGE UP
GLUCOSE UR QL: NEGATIVE — SIGNIFICANT CHANGE UP
KETONES UR-MCNC: NEGATIVE — SIGNIFICANT CHANGE UP
LEUKOCYTE ESTERASE UR-ACNC: NEGATIVE — SIGNIFICANT CHANGE UP
NITRITE UR-MCNC: NEGATIVE — SIGNIFICANT CHANGE UP
PH UR: 6.5 — SIGNIFICANT CHANGE UP (ref 5–8)
PROT UR-MCNC: NEGATIVE — SIGNIFICANT CHANGE UP
SP GR SPEC: 1.01 — SIGNIFICANT CHANGE UP (ref 1–1.05)
UROBILINOGEN FLD QL: SIGNIFICANT CHANGE UP

## 2021-09-10 NOTE — OB PROVIDER TRIAGE NOTE - NSHPLABSRESULTS_GEN_ALL_CORE
Urinalysis Basic - ( 10 Sep 2021 01:48 )    Color: Colorless / Appearance: Clear / S.006 / pH: x  Gluc: x / Ketone: Negative  / Bili: Negative / Urobili: <2 mg/dL   Blood: x / Protein: Negative / Nitrite: Negative   Leuk Esterase: Negative / RBC: x / WBC x   Sq Epi: x / Non Sq Epi: x / Bacteria: x

## 2021-09-10 NOTE — OB PROVIDER TRIAGE NOTE - ADDITIONAL INSTRUCTIONS
follow up in office with next scheduled appointment   fetal kick counts  increase hydration  continue to monitor Blood pressure as instructed

## 2021-09-10 NOTE — OB PROVIDER TRIAGE NOTE - NS_OBGYNHISTORY_OBGYN_ALL_OB_FT
2019-  - 37.5 weeks boy 7# 6oz (shorten cervix and Pre-Ecla)  2018-  36 weeks IUFD     GYN hx: Denies any fibroids, STI's, polyps, cyst or abnormal Pap's       AP: Chronic HTN- on Amlodipine 5mg PO QD- stopped before pregnancy not on any meds now   shorten cervix - no meds  urinary incontinence

## 2021-09-10 NOTE — OB PROVIDER TRIAGE NOTE - NSOBPROVIDERNOTE_OBGYN_ALL_OB_FT
Discussed with Dr. Steiner- no evidence of PPROM noted  re-assuring fetal status.   RX given for Terconazole for yeast infection   pt to follow up in office

## 2021-09-10 NOTE — OB PROVIDER TRIAGE NOTE - NS_FINALEDD_OBGYN_ALL_OB_DT
37 y.o. female  at 39w4d   Reports + FM, denies VB, LOF or regular CTX  Doing well without concerns, suggested EPO supplement BID, baby OP position changes recommended  TW lbs   Reviewed warning signs, normal FKCs, labor precautions and how/when to call.  RTC x 1 wk with US, discussed scheduling IOL pt will wait until next week after US, call or present sooner prn. al       12-Feb-2022

## 2021-09-10 NOTE — OB PROVIDER TRIAGE NOTE - NSHPPHYSICALEXAM_GEN_ALL_CORE
Alert and Oriented x 3  Lung CTAB  Heart RRR  Abdomen gravid soft and nontender    Sono: TAUS   BPP 8/8  presentation vertex  placenta: posterior  FHR: 143  MVP: 4 x 5  TVUS-   3.27-3.67cm no funneling or dynamic change noted     Speculum Exam:  no pooling noted  no vaginal bleeding  + yeast noted    Vital Signs Last 24 Hrs  T(C): 37 (09 Sep 2021 21:01), Max: 37 (09 Sep 2021 21:01)  T(F): 98.6 (09 Sep 2021 21:01), Max: 98.6 (09 Sep 2021 21:01)  HR: 100 (10 Sep 2021 01:21) (92 - 100)  BP: 139/94 (10 Sep 2021 01:21) (137/87 - 139/94)  RR: 16 (09 Sep 2021 21:01) (16 - 16)

## 2021-09-10 NOTE — OB PROVIDER TRIAGE NOTE - HISTORY OF PRESENT ILLNESS
35 y/o F  @ 17.6 weeks presented to triage from the office with c/o fluid on her underwear x 1. Patient states that she has a hx of urinary incontinence and is not sure if her water broke or if it was urine. Pt states she got an amniocentesis on  and has hx of shorten cervix, chronic HTN and IUFD however, denies any other complaints at the moment.

## 2021-10-01 ENCOUNTER — ASOB RESULT (OUTPATIENT)
Age: 36
End: 2021-10-01

## 2021-10-01 ENCOUNTER — APPOINTMENT (OUTPATIENT)
Dept: ANTEPARTUM | Facility: CLINIC | Age: 36
End: 2021-10-01
Payer: COMMERCIAL

## 2021-10-01 PROCEDURE — 76811 OB US DETAILED SNGL FETUS: CPT

## 2021-10-14 ENCOUNTER — APPOINTMENT (OUTPATIENT)
Dept: ANTEPARTUM | Facility: CLINIC | Age: 36
End: 2021-10-14

## 2021-10-21 ENCOUNTER — APPOINTMENT (OUTPATIENT)
Dept: MATERNAL FETAL MEDICINE | Facility: CLINIC | Age: 36
End: 2021-10-21
Payer: COMMERCIAL

## 2021-10-21 ENCOUNTER — ASOB RESULT (OUTPATIENT)
Age: 36
End: 2021-10-21

## 2021-10-21 ENCOUNTER — APPOINTMENT (OUTPATIENT)
Dept: ANTEPARTUM | Facility: CLINIC | Age: 36
End: 2021-10-21
Payer: COMMERCIAL

## 2021-10-21 VITALS
HEART RATE: 92 BPM | WEIGHT: 172 LBS | OXYGEN SATURATION: 98 % | RESPIRATION RATE: 18 BRPM | HEIGHT: 59 IN | BODY MASS INDEX: 34.68 KG/M2 | DIASTOLIC BLOOD PRESSURE: 68 MMHG | SYSTOLIC BLOOD PRESSURE: 110 MMHG

## 2021-10-21 DIAGNOSIS — O09.522 SUPERVISION OF ELDERLY MULTIGRAVIDA, SECOND TRIMESTER: ICD-10-CM

## 2021-10-21 DIAGNOSIS — Z86.79 PERSONAL HISTORY OF OTHER DISEASES OF THE CIRCULATORY SYSTEM: ICD-10-CM

## 2021-10-21 DIAGNOSIS — Z87.59 PERSONAL HISTORY OF OTHER COMPLICATIONS OF PREGNANCY, CHILDBIRTH AND THE PUERPERIUM: ICD-10-CM

## 2021-10-21 DIAGNOSIS — O09.299 SUPERVISION OF PREGNANCY WITH OTHER POOR REPRODUCTIVE OR OBSTETRIC HISTORY, UNSPECIFIED TRIMESTER: ICD-10-CM

## 2021-10-21 DIAGNOSIS — Z83.3 FAMILY HISTORY OF DIABETES MELLITUS: ICD-10-CM

## 2021-10-21 DIAGNOSIS — E34.3 SHORT STATURE DUE TO ENDOCRINE DISORDER: ICD-10-CM

## 2021-10-21 DIAGNOSIS — Z83.49 FAMILY HISTORY OF OTHER ENDOCRINE, NUTRITIONAL AND METABOLIC DISEASES: ICD-10-CM

## 2021-10-21 DIAGNOSIS — Z82.49 FAMILY HISTORY OF ISCHEMIC HEART DISEASE AND OTHER DISEASES OF THE CIRCULATORY SYSTEM: ICD-10-CM

## 2021-10-21 PROCEDURE — 76817 TRANSVAGINAL US OBSTETRIC: CPT

## 2021-10-21 PROCEDURE — 99214 OFFICE O/P EST MOD 30 MIN: CPT | Mod: 25

## 2021-10-21 RX ORDER — VITAMIN C, CALCIUM, IRON, VITAMIN D3, VITAMIN E, VITAMIN B1, VITAMIN B2, VITAMIN B3, VITAMIN B6, FOLIC ACID, IODINE, ZINC, COPPER, DOCUSATE SODIUM, DOCOSAHEXAENOIC ACID (DHA) 27-1-50 MG
KIT ORAL
Refills: 0 | Status: ACTIVE | COMMUNITY

## 2021-10-21 NOTE — DATA REVIEWED
[FreeTextEntry1] : Cervical length was evaluated today due to history of cervical shortening in prior pregnancy.  Lebngth appears normal and stable from prior measurement. \par \par Her hypertension was previously treated with amliodipine, which she had discontinued with pregnancy. Her BP readings at home as well as every visit with us are low normal, including todays visit which was 110/68\par \par She maintains her baby aspirin for PIH prophylaxis, and understands the need for increased monitoring due to her complicated history

## 2021-10-21 NOTE — HISTORY OF PRESENT ILLNESS
[FreeTextEntry1] : Sandra presents for discussion regarding her prior hisotry of chronic hypertension, prior 36 weeks IUFD, and the subsequent pregnancy with a full term delivery that was complicated by preeclampsia.

## 2021-10-21 NOTE — DISCUSSION/SUMMARY
[FreeTextEntry1] : Growth scan at 28 weeks, and every 4 weeks for interval growth\par \par Weekly fetal testing to begin at 32 weeks\par \par Monitor BP and will add medications as indicated. \par \par Screen for preeclampsia at each visit.

## 2021-11-29 ENCOUNTER — APPOINTMENT (OUTPATIENT)
Dept: ANTEPARTUM | Facility: CLINIC | Age: 36
End: 2021-11-29
Payer: COMMERCIAL

## 2021-11-29 ENCOUNTER — ASOB RESULT (OUTPATIENT)
Age: 36
End: 2021-11-29

## 2021-11-29 PROCEDURE — 76816 OB US FOLLOW-UP PER FETUS: CPT

## 2021-11-29 PROCEDURE — 93976 VASCULAR STUDY: CPT

## 2021-11-29 PROCEDURE — 76820 UMBILICAL ARTERY ECHO: CPT

## 2021-12-07 ENCOUNTER — APPOINTMENT (OUTPATIENT)
Dept: MATERNAL FETAL MEDICINE | Facility: CLINIC | Age: 36
End: 2021-12-07

## 2021-12-09 ENCOUNTER — ASOB RESULT (OUTPATIENT)
Age: 36
End: 2021-12-09

## 2021-12-09 ENCOUNTER — APPOINTMENT (OUTPATIENT)
Dept: MATERNAL FETAL MEDICINE | Facility: CLINIC | Age: 36
End: 2021-12-09

## 2021-12-09 RX ORDER — ISOPROPYL ALCOHOL 0.7 ML/ML
SWAB TOPICAL
Qty: 2 | Refills: 2 | Status: ACTIVE | COMMUNITY
Start: 2021-12-09 | End: 1900-01-01

## 2021-12-09 RX ORDER — ELECTROLYTES/DEXTROSE
32G X 4 MM SOLUTION, ORAL ORAL
Qty: 1 | Refills: 1 | Status: ACTIVE | COMMUNITY
Start: 2021-12-09 | End: 1900-01-01

## 2021-12-09 RX ORDER — INSULIN LISPRO 100 [IU]/ML
100 INJECTION, SOLUTION INTRAVENOUS; SUBCUTANEOUS
Qty: 1 | Refills: 2 | Status: ACTIVE | COMMUNITY
Start: 2021-12-09 | End: 1900-01-01

## 2021-12-13 ENCOUNTER — APPOINTMENT (OUTPATIENT)
Dept: ANTEPARTUM | Facility: CLINIC | Age: 36
End: 2021-12-13

## 2021-12-28 ENCOUNTER — ASOB RESULT (OUTPATIENT)
Age: 36
End: 2021-12-28

## 2021-12-28 ENCOUNTER — APPOINTMENT (OUTPATIENT)
Dept: ANTEPARTUM | Facility: CLINIC | Age: 36
End: 2021-12-28
Payer: COMMERCIAL

## 2021-12-28 ENCOUNTER — APPOINTMENT (OUTPATIENT)
Dept: MATERNAL FETAL MEDICINE | Facility: CLINIC | Age: 36
End: 2021-12-28

## 2021-12-28 VITALS
SYSTOLIC BLOOD PRESSURE: 118 MMHG | OXYGEN SATURATION: 98 % | DIASTOLIC BLOOD PRESSURE: 80 MMHG | WEIGHT: 173.44 LBS | RESPIRATION RATE: 18 BRPM | HEIGHT: 59 IN | HEART RATE: 89 BPM | BODY MASS INDEX: 34.96 KG/M2

## 2021-12-28 PROCEDURE — 93976 VASCULAR STUDY: CPT

## 2021-12-28 PROCEDURE — 76816 OB US FOLLOW-UP PER FETUS: CPT

## 2021-12-28 PROCEDURE — 76820 UMBILICAL ARTERY ECHO: CPT

## 2021-12-28 RX ORDER — BLOOD SUGAR DIAGNOSTIC
STRIP MISCELLANEOUS
Qty: 300 | Refills: 0 | Status: ACTIVE | COMMUNITY
Start: 2021-12-01

## 2021-12-28 RX ORDER — TERCONAZOLE 4 MG/G
0.4 CREAM VAGINAL
Qty: 45 | Refills: 0 | Status: DISCONTINUED | COMMUNITY
Start: 2021-10-21

## 2021-12-28 RX ORDER — BLOOD-GLUCOSE METER
W/DEVICE EACH MISCELLANEOUS
Qty: 1 | Refills: 0 | Status: ACTIVE | COMMUNITY
Start: 2021-12-01

## 2022-01-05 ENCOUNTER — OUTPATIENT (OUTPATIENT)
Dept: OUTPATIENT SERVICES | Facility: HOSPITAL | Age: 37
LOS: 1 days | End: 2022-01-05
Payer: COMMERCIAL

## 2022-01-05 DIAGNOSIS — O26.899 OTHER SPECIFIED PREGNANCY RELATED CONDITIONS, UNSPECIFIED TRIMESTER: ICD-10-CM

## 2022-01-05 DIAGNOSIS — Z3A.00 WEEKS OF GESTATION OF PREGNANCY NOT SPECIFIED: ICD-10-CM

## 2022-01-05 LAB
APPEARANCE UR: CLEAR — SIGNIFICANT CHANGE UP
BACTERIA # UR AUTO: ABNORMAL /HPF
BILIRUB UR-MCNC: NEGATIVE — SIGNIFICANT CHANGE UP
COLOR SPEC: YELLOW — SIGNIFICANT CHANGE UP
DIFF PNL FLD: NEGATIVE — SIGNIFICANT CHANGE UP
EPI CELLS # UR: SIGNIFICANT CHANGE UP /HPF
GLUCOSE UR QL: NEGATIVE — SIGNIFICANT CHANGE UP
KETONES UR-MCNC: NEGATIVE — SIGNIFICANT CHANGE UP
LEUKOCYTE ESTERASE UR-ACNC: ABNORMAL
NITRITE UR-MCNC: NEGATIVE — SIGNIFICANT CHANGE UP
PH UR: 7 — SIGNIFICANT CHANGE UP (ref 5–8)
PROT UR-MCNC: NEGATIVE — SIGNIFICANT CHANGE UP
RBC CASTS # UR COMP ASSIST: SIGNIFICANT CHANGE UP /HPF (ref 0–2)
SARS-COV-2 RNA SPEC QL NAA+PROBE: DETECTED
SP GR SPEC: 1.01 — SIGNIFICANT CHANGE UP (ref 1.01–1.02)
UROBILINOGEN FLD QL: NEGATIVE — SIGNIFICANT CHANGE UP
WBC UR QL: SIGNIFICANT CHANGE UP /HPF (ref 0–5)

## 2022-01-05 PROCEDURE — 87086 URINE CULTURE/COLONY COUNT: CPT

## 2022-01-05 PROCEDURE — 76819 FETAL BIOPHYS PROFIL W/O NST: CPT | Mod: 26

## 2022-01-05 PROCEDURE — 81001 URINALYSIS AUTO W/SCOPE: CPT

## 2022-01-05 PROCEDURE — 99284 EMERGENCY DEPT VISIT MOD MDM: CPT

## 2022-01-05 PROCEDURE — 59025 FETAL NON-STRESS TEST: CPT

## 2022-01-05 PROCEDURE — 87635 SARS-COV-2 COVID-19 AMP PRB: CPT

## 2022-01-05 PROCEDURE — 76819 FETAL BIOPHYS PROFIL W/O NST: CPT

## 2022-01-05 PROCEDURE — 82962 GLUCOSE BLOOD TEST: CPT

## 2022-01-05 PROCEDURE — G0463: CPT

## 2022-01-07 LAB
CULTURE RESULTS: SIGNIFICANT CHANGE UP
SPECIMEN SOURCE: SIGNIFICANT CHANGE UP

## 2022-01-10 ENCOUNTER — OUTPATIENT (OUTPATIENT)
Dept: INPATIENT UNIT | Facility: HOSPITAL | Age: 37
LOS: 1 days | Discharge: HOME | End: 2022-01-10
Payer: COMMERCIAL

## 2022-01-10 ENCOUNTER — APPOINTMENT (OUTPATIENT)
Dept: MATERNAL FETAL MEDICINE | Facility: CLINIC | Age: 37
End: 2022-01-10
Payer: COMMERCIAL

## 2022-01-10 ENCOUNTER — APPOINTMENT (OUTPATIENT)
Dept: ANTEPARTUM | Facility: CLINIC | Age: 37
End: 2022-01-10

## 2022-01-10 VITALS
RESPIRATION RATE: 16 BRPM | DIASTOLIC BLOOD PRESSURE: 80 MMHG | BODY MASS INDEX: 35.68 KG/M2 | SYSTOLIC BLOOD PRESSURE: 118 MMHG | OXYGEN SATURATION: 99 % | HEART RATE: 92 BPM | WEIGHT: 177 LBS | HEIGHT: 59 IN

## 2022-01-10 VITALS
DIASTOLIC BLOOD PRESSURE: 78 MMHG | HEART RATE: 118 BPM | SYSTOLIC BLOOD PRESSURE: 127 MMHG | RESPIRATION RATE: 16 BRPM | TEMPERATURE: 98 F

## 2022-01-10 VITALS — SYSTOLIC BLOOD PRESSURE: 120 MMHG | HEART RATE: 106 BPM | DIASTOLIC BLOOD PRESSURE: 81 MMHG

## 2022-01-10 DIAGNOSIS — Z3A.00 WEEKS OF GESTATION OF PREGNANCY NOT SPECIFIED: ICD-10-CM

## 2022-01-10 DIAGNOSIS — Z3A.23 23 WEEKS GESTATION OF PREGNANCY: ICD-10-CM

## 2022-01-10 DIAGNOSIS — O24.414 GESTATIONAL DIABETES MELLITUS IN PREGNANCY, INSULIN CONTROLLED: ICD-10-CM

## 2022-01-10 DIAGNOSIS — O09.522 SUPERVISION OF ELDERLY MULTIGRAVIDA, SECOND TRIMESTER: ICD-10-CM

## 2022-01-10 DIAGNOSIS — O10.919 UNSPECIFIED PRE-EXISTING HYPERTENSION COMPLICATING PREGNANCY, UNSPECIFIED TRIMESTER: ICD-10-CM

## 2022-01-10 DIAGNOSIS — O26.899 OTHER SPECIFIED PREGNANCY RELATED CONDITIONS, UNSPECIFIED TRIMESTER: ICD-10-CM

## 2022-01-10 DIAGNOSIS — O99.212 OBESITY COMPLICATING PREGNANCY, SECOND TRIMESTER: ICD-10-CM

## 2022-01-10 DIAGNOSIS — O09.299 SUPERVISION OF PREGNANCY WITH OTHER POOR REPRODUCTIVE OR OBSTETRIC HISTORY, UNSPECIFIED TRIMESTER: ICD-10-CM

## 2022-01-10 PROCEDURE — 99212 OFFICE O/P EST SF 10 MIN: CPT

## 2022-01-10 PROCEDURE — 99213 OFFICE O/P EST LOW 20 MIN: CPT

## 2022-01-10 PROCEDURE — 76818 FETAL BIOPHYS PROFILE W/NST: CPT | Mod: 26

## 2022-01-10 RX ORDER — ASPIRIN/CALCIUM CARB/MAGNESIUM 324 MG
1 TABLET ORAL
Qty: 0 | Refills: 0 | DISCHARGE

## 2022-01-10 RX ORDER — CEPHALEXIN 500 MG/1
500 CAPSULE ORAL
Qty: 14 | Refills: 0 | Status: ACTIVE | COMMUNITY
Start: 2021-10-07

## 2022-01-10 RX ORDER — ASPIRIN 81 MG/1
81 TABLET, CHEWABLE ORAL
Qty: 180 | Refills: 0 | Status: ACTIVE | COMMUNITY
Start: 2021-07-14

## 2022-01-10 RX ORDER — TERCONAZOLE 8 MG/G
0.8 CREAM VAGINAL
Qty: 20 | Refills: 0 | Status: ACTIVE | COMMUNITY
Start: 2021-09-10

## 2022-01-10 RX ORDER — LANCETS
EACH MISCELLANEOUS
Qty: 300 | Refills: 0 | Status: ACTIVE | COMMUNITY
Start: 2021-12-01

## 2022-01-10 NOTE — OB PROVIDER TRIAGE NOTE - NSHPPHYSICALEXAM_GEN_ALL_CORE
Vital Signs Last 24 Hrs  T(C): 36.9 (10 Curtis 2022 09:12), Max: 36.9 (10 Curtis 2022 09:12)  T(F): 98.4 (10 Curtis 2022 09:12), Max: 98.4 (10 Curtis 2022 09:12)  HR: 99 (10 Curtis 2022 09:55) (99 - 129)  BP: 116/70 (10 Curtis 2022 09:54) (116/70 - 127/78)  RR: 16 (10 Curtis 2022 09:12) (16 - 16)  SpO2: 98% (10 Curtis 2022 10:00) (97% - 99%)    Abdomen gravid, soft and nontender  SSE - neg pooling/neg nitrazine/neg ferning  SVE -1/l/-3  TAS -   No evidence of ROM at this time.  Sceduled for MFM today @ 1400. Vital Signs Last 24 Hrs  T(C): 36.9 (10 Curtis 2022 09:12), Max: 36.9 (10 Curtis 2022 09:12)  T(F): 98.4 (10 Curtis 2022 09:12), Max: 98.4 (10 Curtis 2022 09:12)  HR: 99 (10 Curtis 2022 09:55) (99 - 129)  BP: 116/70 (10 Curtis 2022 09:54) (116/70 - 127/78)  RR: 16 (10 Curtis 2022 09:12) (16 - 16)  SpO2: 98% (10 Curtis 2022 10:00) (97% - 99%)    Abdomen gravid, soft and nontender  SSE - neg pooling/neg nitrazine/neg ferning  SVE -1/l/-3  TAS - vtx/post plac/tami 13.39  BPP - 8/8  No evidence of ROM at this time.  Sceduled for MFM today @ 1400.

## 2022-01-10 NOTE — OB RN TRIAGE NOTE - CURRENT PREGNANCY COMPLICATIONS, OB PROFILE
Incompetent Cervix/Cervical Insufficiency/Gestational Age less than 36 Weeks 24 hr urine handed in 2 days ago/Gestational Diabetes/Incompetent Cervix/Cervical Insufficiency/Gestational Age less than 36 Weeks/Hypertensive Disorder

## 2022-01-10 NOTE — OB PROVIDER TRIAGE NOTE - NSOBPROVIDERNOTE_OBGYN_ALL_OB_FT
No evidence of ROM at this time.  Sceduled for MFM today @ 1400.    Discussed findings with Dr. Damon  Plan:  patient No evidence of ROM at this time.  Scheduled for MFM today @ 1400.    Discussed findings with Dr. Damon  Plan:  patient is cleared for discharge home  ROM, Vaginal bleeding and labor discussed with patient.  Encouraged to keep her scheduled MFM appointment at 1400.

## 2022-01-10 NOTE — OB PROVIDER TRIAGE NOTE - ADDITIONAL INSTRUCTIONS
No evidence of ROM.  Discussed findings with Dr. Damon  Plan:  patient is cleared for discharge home  ROM, Vaginal bleeding and labor discussed with patient.  Encouraged to keep her scheduled MFM appointment at 1400.

## 2022-01-10 NOTE — OB RN TRIAGE NOTE - FALL HARM RISK - UNIVERSAL INTERVENTIONS
Bed in lowest position, wheels locked, appropriate side rails in place/Call bell, personal items and telephone in reach/Instruct patient to call for assistance before getting out of bed or chair/Non-slip footwear when patient is out of bed/Christopher to call system/Physically safe environment - no spills, clutter or unnecessary equipment/Purposeful Proactive Rounding/Room/bathroom lighting operational, light cord in reach

## 2022-01-10 NOTE — OB PROVIDER TRIAGE NOTE - HISTORY OF PRESENT ILLNESS
PNV Provider: Dr. Bellamy  EDC: .  Patient is a 37 y/o G 3P  @ 35 2/7wks gest. who reports to triage with c/o vaginal pressure and LOF @ 0800.  Denies contractions or vaginal bleeding.    Reports good fetal movements.  AP Course:  GDMA2  Meds: PNV, ASA 81mg & Humalog 8 units before meals  Patient reports +covid -1 19  ON 2021.  Fully vaccinated.  PMHx - hyperlipidemia  OBHx  - @ 36wks - 3/28/2018 - IUFD  - - 2019 - 4lbs 8oz; comp by pec/magnesium sulfate  Psych - reports anxiety since the IUFD; denies tx/meds  PSHx/GYN/SH - denies

## 2022-01-10 NOTE — DISCUSSION/SUMMARY
[FreeTextEntry1] : Please see the previous consultation for the patient's medical and obstetrical history.  The patient is being seen today at 34 weeks and 6 days for advanced maternal age, gestational hypertension, gestational diabetes A2, previous history of stillbirth and maternal obesity.\par \par Evaluation of the patient's diabetic flowsheets reveals fair control of both fasting and postprandial blood sugars.  A growth scan was performed on  and revealed a single viable intrauterine gestation with the estimated fetal weight of 4 pounds 12 ounces which is consistent with the 51st percentile.  Vertex presentation with a posterior placenta was seen and the amniotic fluid index was normal at 14.73 cm.  Normal umbilical artery Doppler S/D ratio.  Vital signs today reveal blood pressure of 118/80, maternal weight is 177 pounds consistent with a BMI of 35.75 kg.\par \par Gestational hypertension;\par \par The patient's blood pressures at home have been running 103-132/90-99.  The patient is on low-dose aspirin 1 tablet twice a day.  She was advised to take 1 tablet on odd days and 2 tablet on even days and to discontinue this medication 1 week prior to delivery.  Antihypertensive medications not indicated at this time and delivery between 37 and 39 weeks is recommended and the patient suggest that delivery was recommended at 38 weeks which is appropriate.  She will call your office if systolic > 160 or diastolic > 110.  \par \par Gestational diabetes A2;\par \par Dietary consultation has been performed and report was sent under separate cover.  The patient is on 8 units of regular insulin prebreakfast, lunch and dinner.  Patient is not eating 3 meals a day nor is she having a daily snack.  The majority of her postprandial blood sugars are within normal limits with those that are elevated due to poor dietary intake.  Majority of her fasting blood sugars are also found to be within normal limits.  Problems and complications related to diabetic pregnancy including fetal macrosomia, increased risk for operative vaginal delivery and  section, shoulder dystocia with associated morbidity mortality, increased risk for stillbirth as well as increased risk for  intensive care admission were discussed.  The patient is no longer working and will try to have 3 meals and 3 snacks on a daily basis.  Weekly  testing until delivery is recommended.  Follow-up dietary consultation next week as recommended.  In addition a 75 g 2-hour GCT after postpartum visit is also recommended.  Daily fetal kick counts discussed and the patient will call your office for decreased fetal movement.\par \par COVID-19;\par \par The patient has completed her COVID-19 vaccination with the Pfizer vaccine.  She was positive for COVID in December and at this time is asymptomatic.  She was reassured that her fetus should be without problems or complications and will be evaluated after delivery.\par \par Her mother has thyroid disease and hypertension and her father has hypertension and diabetes.  She has a history of hypertension and elevated cholesterol.  She has no previous surgical history.  She has had a 36-week intrauterine fetal demise of unknown etiology.  She has no known allergies to medications and denies alcohol, tobacco or drug use.\par \par I spent a total of 24 minutes reviewing the patient's prenatal record, prenatal blood work, outside medical records, previous consultations and ultrasound reports counseling and coordinating care.\par \par Recommendations;\par \par 1.  Continue current ADA diet and glucose monitoring.\par 2.  8 units of regular insulin prebreakfast, lunch and dinner.\par 3.  Follow-up dietary consultation 1 week as scheduled.\par 4.  75 g 2-hour GCT after her postpartum visit.\par 5.  Continue low-dose aspirin 1 tablet on odd days and 2 tablet on even days until 1 week prior to delivery.\par 6.  Delivery between 37 and 39 weeks is recommended.\par 7.  Maternal-fetal medicine consultation as clinically indicated.\par 8.  Daily fetal kick counts discussed and patient will call the office for decreased fetal movement.

## 2022-01-10 NOTE — OB PROVIDER TRIAGE NOTE - CURRENT PREGNANCY COMPLICATIONS, OB PROFILE
24 hr urine handed in 2 days ago/Gestational Diabetes/Incompetent Cervix/Cervical Insufficiency/Gestational Age less than 36 Weeks/Hypertensive Disorder

## 2022-01-10 NOTE — OB RN TRIAGE NOTE - NS_OBGYNHISTORY_OBGYN_ALL_OB_FT
Iufd @36 wks 2018   2019 pec on magnesium Iufd @36 wks 3/28/2018 nsd   2019 4#8, 37.5wks pec on magnesium

## 2022-01-18 ENCOUNTER — APPOINTMENT (OUTPATIENT)
Dept: ANTEPARTUM | Facility: CLINIC | Age: 37
End: 2022-01-18

## 2022-01-19 ENCOUNTER — APPOINTMENT (OUTPATIENT)
Dept: MATERNAL FETAL MEDICINE | Facility: CLINIC | Age: 37
End: 2022-01-19
Payer: COMMERCIAL

## 2022-01-19 ENCOUNTER — ASOB RESULT (OUTPATIENT)
Age: 37
End: 2022-01-19

## 2022-01-19 PROCEDURE — G0108 DIAB MANAGE TRN  PER INDIV: CPT | Mod: 95

## 2022-01-21 ENCOUNTER — NON-APPOINTMENT (OUTPATIENT)
Age: 37
End: 2022-01-21

## 2022-01-25 ENCOUNTER — APPOINTMENT (OUTPATIENT)
Dept: ANTEPARTUM | Facility: CLINIC | Age: 37
End: 2022-01-25

## 2022-01-25 ENCOUNTER — ASOB RESULT (OUTPATIENT)
Age: 37
End: 2022-01-25

## 2022-01-25 ENCOUNTER — APPOINTMENT (OUTPATIENT)
Dept: MATERNAL FETAL MEDICINE | Facility: CLINIC | Age: 37
End: 2022-01-25
Payer: COMMERCIAL

## 2022-01-25 VITALS — HEIGHT: 59 IN | WEIGHT: 180 LBS | BODY MASS INDEX: 36.29 KG/M2

## 2022-01-25 PROCEDURE — G0108 DIAB MANAGE TRN  PER INDIV: CPT | Mod: 95

## 2022-01-29 ENCOUNTER — INPATIENT (INPATIENT)
Facility: HOSPITAL | Age: 37
LOS: 1 days | Discharge: HOME | End: 2022-01-31
Attending: OBSTETRICS & GYNECOLOGY | Admitting: OBSTETRICS & GYNECOLOGY

## 2022-01-29 ENCOUNTER — TRANSCRIPTION ENCOUNTER (OUTPATIENT)
Age: 37
End: 2022-01-29

## 2022-01-29 VITALS
DIASTOLIC BLOOD PRESSURE: 88 MMHG | WEIGHT: 182.1 LBS | SYSTOLIC BLOOD PRESSURE: 139 MMHG | HEART RATE: 97 BPM | RESPIRATION RATE: 18 BRPM | HEIGHT: 59 IN | TEMPERATURE: 98 F

## 2022-01-29 DIAGNOSIS — O24.419 GESTATIONAL DIABETES MELLITUS IN PREGNANCY, UNSPECIFIED CONTROL: ICD-10-CM

## 2022-01-29 LAB
ALBUMIN SERPL ELPH-MCNC: 3.2 G/DL — LOW (ref 3.3–5)
ALP SERPL-CCNC: 179 U/L — HIGH (ref 40–120)
ALT FLD-CCNC: 19 U/L — SIGNIFICANT CHANGE UP (ref 4–33)
ANION GAP SERPL CALC-SCNC: 10 MMOL/L — SIGNIFICANT CHANGE UP (ref 7–14)
APPEARANCE UR: CLEAR — SIGNIFICANT CHANGE UP
APTT BLD: 26.7 SEC — LOW (ref 27–36.3)
AST SERPL-CCNC: 20 U/L — SIGNIFICANT CHANGE UP (ref 4–32)
BASOPHILS # BLD AUTO: 0.02 K/UL — SIGNIFICANT CHANGE UP (ref 0–0.2)
BASOPHILS NFR BLD AUTO: 0.2 % — SIGNIFICANT CHANGE UP (ref 0–2)
BILIRUB SERPL-MCNC: <0.2 MG/DL — SIGNIFICANT CHANGE UP (ref 0.2–1.2)
BILIRUB UR-MCNC: NEGATIVE — SIGNIFICANT CHANGE UP
BLD GP AB SCN SERPL QL: NEGATIVE — SIGNIFICANT CHANGE UP
BUN SERPL-MCNC: 9 MG/DL — SIGNIFICANT CHANGE UP (ref 7–23)
CALCIUM SERPL-MCNC: 8.8 MG/DL — SIGNIFICANT CHANGE UP (ref 8.4–10.5)
CHLORIDE SERPL-SCNC: 106 MMOL/L — SIGNIFICANT CHANGE UP (ref 98–107)
CO2 SERPL-SCNC: 20 MMOL/L — LOW (ref 22–31)
COLOR SPEC: SIGNIFICANT CHANGE UP
COVID-19 SPIKE DOMAIN AB INTERP: POSITIVE
COVID-19 SPIKE DOMAIN ANTIBODY RESULT: 202 U/ML — HIGH
CREAT ?TM UR-MCNC: 18 MG/DL — SIGNIFICANT CHANGE UP
CREAT SERPL-MCNC: 0.58 MG/DL — SIGNIFICANT CHANGE UP (ref 0.5–1.3)
DIFF PNL FLD: NEGATIVE — SIGNIFICANT CHANGE UP
EOSINOPHIL # BLD AUTO: 0.06 K/UL — SIGNIFICANT CHANGE UP (ref 0–0.5)
EOSINOPHIL NFR BLD AUTO: 0.7 % — SIGNIFICANT CHANGE UP (ref 0–6)
FIBRINOGEN PPP-MCNC: 621 MG/DL — HIGH (ref 290–520)
GLUCOSE BLDC GLUCOMTR-MCNC: 101 MG/DL — HIGH (ref 70–99)
GLUCOSE BLDC GLUCOMTR-MCNC: 93 MG/DL — SIGNIFICANT CHANGE UP (ref 70–99)
GLUCOSE SERPL-MCNC: 103 MG/DL — HIGH (ref 70–99)
GLUCOSE UR QL: NEGATIVE — SIGNIFICANT CHANGE UP
HCT VFR BLD CALC: 36.6 % — SIGNIFICANT CHANGE UP (ref 34.5–45)
HGB BLD-MCNC: 12.4 G/DL — SIGNIFICANT CHANGE UP (ref 11.5–15.5)
IANC: 5.88 K/UL — SIGNIFICANT CHANGE UP (ref 1.5–8.5)
IMM GRANULOCYTES NFR BLD AUTO: 0.7 % — SIGNIFICANT CHANGE UP (ref 0–1.5)
INR BLD: 0.98 RATIO — SIGNIFICANT CHANGE UP (ref 0.88–1.16)
KETONES UR-MCNC: NEGATIVE — SIGNIFICANT CHANGE UP
LDH SERPL L TO P-CCNC: 175 U/L — SIGNIFICANT CHANGE UP (ref 135–225)
LEUKOCYTE ESTERASE UR-ACNC: NEGATIVE — SIGNIFICANT CHANGE UP
LYMPHOCYTES # BLD AUTO: 2.08 K/UL — SIGNIFICANT CHANGE UP (ref 1–3.3)
LYMPHOCYTES # BLD AUTO: 24 % — SIGNIFICANT CHANGE UP (ref 13–44)
MCHC RBC-ENTMCNC: 29.7 PG — SIGNIFICANT CHANGE UP (ref 27–34)
MCHC RBC-ENTMCNC: 33.9 GM/DL — SIGNIFICANT CHANGE UP (ref 32–36)
MCV RBC AUTO: 87.6 FL — SIGNIFICANT CHANGE UP (ref 80–100)
MONOCYTES # BLD AUTO: 0.56 K/UL — SIGNIFICANT CHANGE UP (ref 0–0.9)
MONOCYTES NFR BLD AUTO: 6.5 % — SIGNIFICANT CHANGE UP (ref 2–14)
NEUTROPHILS # BLD AUTO: 5.88 K/UL — SIGNIFICANT CHANGE UP (ref 1.8–7.4)
NEUTROPHILS NFR BLD AUTO: 67.9 % — SIGNIFICANT CHANGE UP (ref 43–77)
NITRITE UR-MCNC: NEGATIVE — SIGNIFICANT CHANGE UP
NRBC # BLD: 0 /100 WBCS — SIGNIFICANT CHANGE UP
NRBC # FLD: 0 K/UL — SIGNIFICANT CHANGE UP
PH UR: 7.5 — SIGNIFICANT CHANGE UP (ref 5–8)
PLATELET # BLD AUTO: 230 K/UL — SIGNIFICANT CHANGE UP (ref 150–400)
POTASSIUM SERPL-MCNC: 3.7 MMOL/L — SIGNIFICANT CHANGE UP (ref 3.5–5.3)
POTASSIUM SERPL-SCNC: 3.7 MMOL/L — SIGNIFICANT CHANGE UP (ref 3.5–5.3)
PROT ?TM UR-MCNC: 4 MG/DL — SIGNIFICANT CHANGE UP
PROT SERPL-MCNC: 6.2 G/DL — SIGNIFICANT CHANGE UP (ref 6–8.3)
PROT UR-MCNC: NEGATIVE — SIGNIFICANT CHANGE UP
PROT/CREAT UR-RTO: 0.2 RATIO — SIGNIFICANT CHANGE UP (ref 0–0.2)
PROTHROM AB SERPL-ACNC: 11.3 SEC — SIGNIFICANT CHANGE UP (ref 10.6–13.6)
RBC # BLD: 4.18 M/UL — SIGNIFICANT CHANGE UP (ref 3.8–5.2)
RBC # FLD: 15.9 % — HIGH (ref 10.3–14.5)
RH IG SCN BLD-IMP: POSITIVE — SIGNIFICANT CHANGE UP
SARS-COV-2 IGG+IGM SERPL QL IA: 202 U/ML — HIGH
SARS-COV-2 IGG+IGM SERPL QL IA: POSITIVE
SODIUM SERPL-SCNC: 136 MMOL/L — SIGNIFICANT CHANGE UP (ref 135–145)
SP GR SPEC: 1.01 — SIGNIFICANT CHANGE UP (ref 1–1.05)
URATE SERPL-MCNC: 4.2 MG/DL — SIGNIFICANT CHANGE UP (ref 2.5–7)
UROBILINOGEN FLD QL: SIGNIFICANT CHANGE UP
WBC # BLD: 8.66 K/UL — SIGNIFICANT CHANGE UP (ref 3.8–10.5)
WBC # FLD AUTO: 8.66 K/UL — SIGNIFICANT CHANGE UP (ref 3.8–10.5)

## 2022-01-29 RX ORDER — SODIUM CHLORIDE 9 MG/ML
1000 INJECTION, SOLUTION INTRAVENOUS
Refills: 0 | Status: DISCONTINUED | OUTPATIENT
Start: 2022-01-29 | End: 2022-01-31

## 2022-01-29 RX ORDER — OXYTOCIN 10 UNIT/ML
VIAL (ML) INJECTION
Qty: 30 | Refills: 0 | Status: DISCONTINUED | OUTPATIENT
Start: 2022-01-29 | End: 2022-01-30

## 2022-01-29 RX ORDER — CITRIC ACID/SODIUM CITRATE 300-500 MG
15 SOLUTION, ORAL ORAL EVERY 6 HOURS
Refills: 0 | Status: DISCONTINUED | OUTPATIENT
Start: 2022-01-29 | End: 2022-01-30

## 2022-01-29 RX ORDER — SODIUM CHLORIDE 9 MG/ML
1000 INJECTION, SOLUTION INTRAVENOUS
Refills: 0 | Status: DISCONTINUED | OUTPATIENT
Start: 2022-01-29 | End: 2022-01-29

## 2022-01-29 RX ORDER — SODIUM CHLORIDE 9 MG/ML
1000 INJECTION INTRAMUSCULAR; INTRAVENOUS; SUBCUTANEOUS
Refills: 0 | Status: DISCONTINUED | OUTPATIENT
Start: 2022-01-29 | End: 2022-01-31

## 2022-01-29 RX ORDER — OXYTOCIN 10 UNIT/ML
333.33 VIAL (ML) INJECTION
Qty: 20 | Refills: 0 | Status: DISCONTINUED | OUTPATIENT
Start: 2022-01-29 | End: 2022-01-31

## 2022-01-29 RX ADMIN — Medication 2 MILLIUNIT(S)/MIN: at 22:38

## 2022-01-29 RX ADMIN — SODIUM CHLORIDE 125 MILLILITER(S): 9 INJECTION, SOLUTION INTRAVENOUS at 17:29

## 2022-01-29 NOTE — OB PROVIDER H&P - NSICDXPASTMEDICALHX_GEN_ALL_CORE_FT
PAST MEDICAL HISTORY:  H/O severe pre-eclampsia     History of intrauterine fetal death     Hyperlipidemia

## 2022-01-29 NOTE — OB PROVIDER H&P - NSHPPHYSICALEXAM_GEN_ALL_CORE
Gen: NAD  CV: RRR  Pulm: breathing comfortably on RA  Abd: gravid, nontender  Extr: moving all extremities with ease  – VE: /3/50-3  – FHT Cat I: baseline 140, mod variability, +accels, -decels  – [place]: irritability  – Sono: vertex

## 2022-01-29 NOTE — OB PROVIDER H&P - ASSESSMENT
37yo  @38+0 IOL for cHTN for GDMA2    Plan  - Admit to LND. Routine Labs. IVF.  - rotating fluids for A2 and FS q4hrs in latent labor and q2 hrs in active labor  - IOL w/ 1 dose po cytotec -> pitocin  - Fetus: cat 1 tracing. VTX. EFW 3290g by sono. Continuous EFM. No concerns.  - Prenatal issues: cHTN  - f/u HELLP labs  - GBS neg, abx not required  - Pain: epidural PRN    Patient discussed with attending physician, Dr. Cowan.    DAVON Wilson, PGY1

## 2022-01-29 NOTE — OB PROVIDER H&P - CENTRAL VENOUS CATHETER
ARMD OU Periph CR scarring prob old and chronic non progressive. Low risk for VA loss AREDS 2Amsler gridERM OU-OCT MAC performed and reviewed with the patient.  See scan for interpretation.-Continue to monitorRTC: 6mos f/u ARMD & OCT MAC no

## 2022-01-29 NOTE — OB PROVIDER H&P - HISTORY OF PRESENT ILLNESS
HPI: 37yo F  @38+0 presents for scheduled IOL for cHTN and GDMA2. Patient was previously on Metoprolol 25 daily but stopped it per MD because her BP were well controlled prior to becoming pregnant. +FM. -LOF. -CTXs. -VB. Pt denies any other concerns.    PNC: Denies prenatal issues. GBS neg on .  EFW 3290g by rosalie.    OBHx:  FT  c/b sPEC/Mg 7#13             IUFD @36wga, unknown cause  GynHx: denies hx STIs, fibroids, polyps, cysts  PMH: cHTN, HLD. Denies hx clotting or bleeding disorders, HTN, DM  PSH: denies  PFH: no hx congential disorders, bleeding/clotting disorders  Psych: denies anxiety, depression, PPD  Social: denies etoh, smoking, drugs. Safe at home/in relationship.  Meds: 10u Humalog w/ meals, PNV, iron  Allergies: NKDA  Will accept blood transfusions? Yes

## 2022-01-29 NOTE — OB PROVIDER H&P - NSRISKFACTORS_OBGYN_ALL_OB
Intermittent problem  Chronic issue  Pt has seeing Chiropractor for management    - Pt will FU if symptoms persist  Yes

## 2022-01-30 LAB
GLUCOSE BLDC GLUCOMTR-MCNC: 110 MG/DL — HIGH (ref 70–99)
GLUCOSE BLDC GLUCOMTR-MCNC: 112 MG/DL — HIGH (ref 70–99)
GLUCOSE BLDC GLUCOMTR-MCNC: 159 MG/DL — HIGH (ref 70–99)
GLUCOSE BLDC GLUCOMTR-MCNC: 78 MG/DL — SIGNIFICANT CHANGE UP (ref 70–99)
GLUCOSE BLDC GLUCOMTR-MCNC: 87 MG/DL — SIGNIFICANT CHANGE UP (ref 70–99)
T PALLIDUM AB TITR SER: NEGATIVE — SIGNIFICANT CHANGE UP

## 2022-01-30 RX ORDER — ACETAMINOPHEN 500 MG
975 TABLET ORAL
Refills: 0 | Status: DISCONTINUED | OUTPATIENT
Start: 2022-01-30 | End: 2022-01-31

## 2022-01-30 RX ORDER — LANOLIN
1 OINTMENT (GRAM) TOPICAL EVERY 6 HOURS
Refills: 0 | Status: DISCONTINUED | OUTPATIENT
Start: 2022-01-30 | End: 2022-01-31

## 2022-01-30 RX ORDER — HYDROCORTISONE 1 %
1 OINTMENT (GRAM) TOPICAL EVERY 6 HOURS
Refills: 0 | Status: DISCONTINUED | OUTPATIENT
Start: 2022-01-30 | End: 2022-01-31

## 2022-01-30 RX ORDER — OXYCODONE HYDROCHLORIDE 5 MG/1
5 TABLET ORAL
Refills: 0 | Status: DISCONTINUED | OUTPATIENT
Start: 2022-01-30 | End: 2022-01-31

## 2022-01-30 RX ORDER — BENZOCAINE 10 %
1 GEL (GRAM) MUCOUS MEMBRANE EVERY 6 HOURS
Refills: 0 | Status: DISCONTINUED | OUTPATIENT
Start: 2022-01-30 | End: 2022-01-31

## 2022-01-30 RX ORDER — DIPHENHYDRAMINE HCL 50 MG
25 CAPSULE ORAL EVERY 6 HOURS
Refills: 0 | Status: DISCONTINUED | OUTPATIENT
Start: 2022-01-30 | End: 2022-01-31

## 2022-01-30 RX ORDER — IBUPROFEN 200 MG
600 TABLET ORAL EVERY 6 HOURS
Refills: 0 | Status: DISCONTINUED | OUTPATIENT
Start: 2022-01-30 | End: 2022-01-31

## 2022-01-30 RX ORDER — OXYTOCIN 10 UNIT/ML
333.33 VIAL (ML) INJECTION
Qty: 20 | Refills: 0 | Status: DISCONTINUED | OUTPATIENT
Start: 2022-01-30 | End: 2022-01-31

## 2022-01-30 RX ORDER — SIMETHICONE 80 MG/1
80 TABLET, CHEWABLE ORAL EVERY 4 HOURS
Refills: 0 | Status: DISCONTINUED | OUTPATIENT
Start: 2022-01-30 | End: 2022-01-31

## 2022-01-30 RX ORDER — TETANUS TOXOID, REDUCED DIPHTHERIA TOXOID AND ACELLULAR PERTUSSIS VACCINE, ADSORBED 5; 2.5; 8; 8; 2.5 [IU]/.5ML; [IU]/.5ML; UG/.5ML; UG/.5ML; UG/.5ML
0.5 SUSPENSION INTRAMUSCULAR ONCE
Refills: 0 | Status: DISCONTINUED | OUTPATIENT
Start: 2022-01-30 | End: 2022-01-31

## 2022-01-30 RX ORDER — ASPIRIN/CALCIUM CARB/MAGNESIUM 324 MG
0 TABLET ORAL
Qty: 0 | Refills: 0 | DISCHARGE

## 2022-01-30 RX ORDER — MAGNESIUM HYDROXIDE 400 MG/1
30 TABLET, CHEWABLE ORAL
Refills: 0 | Status: DISCONTINUED | OUTPATIENT
Start: 2022-01-30 | End: 2022-01-31

## 2022-01-30 RX ORDER — INSULIN LISPRO 100/ML
8 VIAL (ML) SUBCUTANEOUS
Qty: 0 | Refills: 0 | DISCHARGE

## 2022-01-30 RX ORDER — OXYCODONE HYDROCHLORIDE 5 MG/1
5 TABLET ORAL ONCE
Refills: 0 | Status: DISCONTINUED | OUTPATIENT
Start: 2022-01-30 | End: 2022-01-31

## 2022-01-30 RX ORDER — AER TRAVELER 0.5 G/1
1 SOLUTION RECTAL; TOPICAL EVERY 4 HOURS
Refills: 0 | Status: DISCONTINUED | OUTPATIENT
Start: 2022-01-30 | End: 2022-01-31

## 2022-01-30 RX ORDER — PRAMOXINE HYDROCHLORIDE 150 MG/15G
1 AEROSOL, FOAM RECTAL EVERY 4 HOURS
Refills: 0 | Status: DISCONTINUED | OUTPATIENT
Start: 2022-01-30 | End: 2022-01-31

## 2022-01-30 RX ORDER — IBUPROFEN 200 MG
600 TABLET ORAL EVERY 6 HOURS
Refills: 0 | Status: COMPLETED | OUTPATIENT
Start: 2022-01-30 | End: 2022-12-29

## 2022-01-30 RX ORDER — KETOROLAC TROMETHAMINE 30 MG/ML
30 SYRINGE (ML) INJECTION ONCE
Refills: 0 | Status: DISCONTINUED | OUTPATIENT
Start: 2022-01-30 | End: 2022-01-30

## 2022-01-30 RX ORDER — DIBUCAINE 1 %
1 OINTMENT (GRAM) RECTAL EVERY 6 HOURS
Refills: 0 | Status: DISCONTINUED | OUTPATIENT
Start: 2022-01-30 | End: 2022-01-31

## 2022-01-30 RX ORDER — FERROUS SULFATE 325(65) MG
0 TABLET ORAL
Qty: 0 | Refills: 0 | DISCHARGE

## 2022-01-30 RX ORDER — SODIUM CHLORIDE 9 MG/ML
3 INJECTION INTRAMUSCULAR; INTRAVENOUS; SUBCUTANEOUS EVERY 8 HOURS
Refills: 0 | Status: DISCONTINUED | OUTPATIENT
Start: 2022-01-30 | End: 2022-01-31

## 2022-01-30 RX ADMIN — SODIUM CHLORIDE 3 MILLILITER(S): 9 INJECTION INTRAMUSCULAR; INTRAVENOUS; SUBCUTANEOUS at 21:48

## 2022-01-30 RX ADMIN — Medication 975 MILLIGRAM(S): at 21:48

## 2022-01-30 RX ADMIN — MAGNESIUM HYDROXIDE 30 MILLILITER(S): 400 TABLET, CHEWABLE ORAL at 21:04

## 2022-01-30 RX ADMIN — Medication 1 TABLET(S): at 12:53

## 2022-01-30 RX ADMIN — Medication 30 MILLIGRAM(S): at 04:37

## 2022-01-30 RX ADMIN — SODIUM CHLORIDE 3 MILLILITER(S): 9 INJECTION INTRAMUSCULAR; INTRAVENOUS; SUBCUTANEOUS at 14:38

## 2022-01-30 RX ADMIN — Medication 600 MILLIGRAM(S): at 13:30

## 2022-01-30 RX ADMIN — Medication 975 MILLIGRAM(S): at 20:48

## 2022-01-30 RX ADMIN — Medication 600 MILLIGRAM(S): at 12:53

## 2022-01-30 RX ADMIN — Medication 975 MILLIGRAM(S): at 10:20

## 2022-01-30 RX ADMIN — Medication 600 MILLIGRAM(S): at 19:08

## 2022-01-30 RX ADMIN — SODIUM CHLORIDE 3 MILLILITER(S): 9 INJECTION INTRAMUSCULAR; INTRAVENOUS; SUBCUTANEOUS at 06:03

## 2022-01-30 RX ADMIN — Medication 600 MILLIGRAM(S): at 19:38

## 2022-01-30 RX ADMIN — MAGNESIUM HYDROXIDE 30 MILLILITER(S): 400 TABLET, CHEWABLE ORAL at 09:59

## 2022-01-30 RX ADMIN — Medication 975 MILLIGRAM(S): at 09:56

## 2022-01-30 NOTE — LACTATION INITIAL EVALUATION - LACTATION INTERVENTIONS
initiate/review safe skin-to-skin/initiate/review hand expression/initiate/review techniques for position and latch/review techniques to increase milk supply/review techniques to manage sore nipples/engorgement/initiate/review breast massage/compression/reviewed components of an effective feeding and at least 8 effective feedings per day required/reviewed importance of monitoring infant diapers, the breastfeeding log, and minimum output each day/reviewed risks of artificial nipples/reviewed strategies to transition to breastfeeding only/reviewed benefits and recommendations for rooming in/reviewed feeding on demand/by cue at least 8 times a day

## 2022-01-30 NOTE — PROVIDER CONTACT NOTE (OTHER) - SITUATION
Pt. rang emergency bell, found in bathroom sitting on toilet.  Pt. states "I felt a little dizzy, but I'm fine now."

## 2022-01-30 NOTE — DISCHARGE NOTE OB - MATERIALS PROVIDED
Vaccinations/[facility]  Screening Program/  Immunization Record/Breastfeeding Log/Guide to Postpartum Care/[facility] Hearing Screen Program/Shaken Baby Prevention Handout/Birth Certificate Instructions

## 2022-01-30 NOTE — OB PROVIDER LABOR PROGRESS NOTE - ASSESSMENT
Pt tolerated exam well.    - head not well applied to cervix  - membrane stripped  - c/w pitocin (on 6)  - high fowlers for engagement  - attempt AROM at next VE.    Amyeo Afroz Jereen, PGY-1  d/w Dr Cowan

## 2022-01-30 NOTE — DISCHARGE NOTE OB - NS MD DC FALL RISK RISK
For information on Fall & Injury Prevention, visit: [email or web address] OR  [email or web address] OR  https://www.cdc.gov/steadi/patient.html

## 2022-01-30 NOTE — OB PROVIDER DELIVERY SUMMARY - NSSELHIDDEN_OBGYN_ALL_OB_FT
[NS_DeliveryAttending1_OBGYN_ALL_OB_FT:[id number]],[NS_DeliveryAssist1_OBGYN_ALL_OB_FT:[id number]],[NS_DeliveryRN_OBGYN_ALL_OB_FT:[email or web address]=]

## 2022-01-30 NOTE — DISCHARGE NOTE OB - ADDITIONAL INSTRUCTIONS
follow up @ [facility] office in 1 week for BP check follow up @ [facility] office in 1 week for BP check  Bring BP log to appt for review

## 2022-01-30 NOTE — DISCHARGE NOTE OB - PATIENT PORTAL LINK FT
You can access the FollowMyHealth Patient Portal offered by [facility] by registering at the following website: [email or web address]. By joining [employer]’s FollowMyHealth portal, you will also be able to view your health information using other applications (apps) compatible with our system.

## 2022-01-30 NOTE — DISCHARGE NOTE OB - MEDICATION SUMMARY - MEDICATIONS TO STOP TAKING
I will STOP taking the medications listed below when I get home from the hospital:    Low Dose ASA 81 mg oral tablet  -- orally once a day    HumaLOG 100 units/mL injectable solution  -- 8 unit(s) injectable 3 times a day

## 2022-01-30 NOTE — OB PROVIDER DELIVERY SUMMARY - NSPROVIDERDELIVERYNOTE_OBGYN_ALL_OB_FT
Spontaneous vaginal delivery of liveborn infant from [place]. Nuchalx1, was clamped and cut after delivery of head. Shoulders and body delivered easily. Infant was suctioned and handed off to mother/peds. Placenta delivered intact with a 3 vessel cord. Fundal massage was given and uterine fundus was found to be firm. Vaginal exam revealed an intact cervix, vaginal walls and sulci. 2nd degree laceration was noted and repaired with 3.0 vicryl. Excellent hemostasis noted. Patient was stable. Count was correct x2 AGPARS 8/8     AJereen PGY1

## 2022-01-30 NOTE — OB RN DELIVERY SUMMARY - NSSELHIDDEN_OBGYN_ALL_OB_FT
[NS_DeliveryAttending1_OBGYN_ALL_OB_FT:[id number]==],[NS_DeliveryAssist1_OBGYN_ALL_OB_FT:[id number]],[NS_DeliveryRN_OBGYN_ALL_OB_FT:[email or web address]=]

## 2022-01-30 NOTE — DISCHARGE NOTE OB - CARE PROVIDER_API CALL
Wade Bellamy)  Obstetrics and Gynecology  [address] [place] [address]  Phone: [phone]  Fax: [phone]  Follow Up Time:

## 2022-01-30 NOTE — OB RN DELIVERY SUMMARY - NS_SEPSISRSKCALC_OBGYN_ALL_OB_FT
EOS calculated successfully. EOS Risk Factor: 0.5/1000 live births ([facility] national incidence); GA=38w1d; Temp=98.24; ROM=0.4; GBS='Negative'; Antibiotics='No antibiotics or any antibiotics < 2 hrs prior to birth'

## 2022-01-30 NOTE — CHART NOTE - NSCHARTNOTEFT_GEN_A_CORE
Saw a patient with c/o lightheadedness while in the bathroom    S/P  from this morning at 2:03 AM  EBL -  226  VSS, Afebrile    Found patient sitting at the bedside, eating her dinner.  In no apparent distress.  Offered no complaints (Dizziness, lightheadedness, SOB, etc)  Patient stated that she felt a bit lightheaded when she went to the bathroom, about 1 hr ago, but that it   went away and she doesn't feel it now.  Patient stated that  " It's not like I felt weak or like I would pass out, it was just a feeling that came and went away"    Orthostatic Vital Signs:  Lying - 133/66, 62  Sitting - 119/74, 69  Standing - 122/83, 93      Plan - Encourage hydration/PO fluids, Continue to follow and reassess if needed. Continue routine postpartum Care.

## 2022-01-30 NOTE — DISCHARGE NOTE OB - CARE PLAN
Assessment and plan of treatment:	induction  CHTN, Pregestational DM  Labor and delivery after induction   Principal Discharge DX:	Normal vaginal delivery  Assessment and plan of treatment:	induction  CHTN, Pregestational DM  Labor and delivery after induction   1

## 2022-01-31 VITALS
TEMPERATURE: 98 F | HEART RATE: 86 BPM | OXYGEN SATURATION: 99 % | SYSTOLIC BLOOD PRESSURE: 124 MMHG | RESPIRATION RATE: 20 BRPM | DIASTOLIC BLOOD PRESSURE: 71 MMHG

## 2022-01-31 RX ORDER — AER TRAVELER 0.5 G/1
1 SOLUTION RECTAL; TOPICAL
Qty: 0 | Refills: 0 | DISCHARGE
Start: 2022-01-31

## 2022-01-31 RX ORDER — ACETAMINOPHEN 500 MG
3 TABLET ORAL
Qty: 0 | Refills: 0 | DISCHARGE
Start: 2022-01-31

## 2022-01-31 RX ORDER — DIBUCAINE 1 %
1 OINTMENT (GRAM) RECTAL
Qty: 0 | Refills: 0 | DISCHARGE
Start: 2022-01-31

## 2022-01-31 RX ORDER — IBUPROFEN 200 MG
1 TABLET ORAL
Qty: 0 | Refills: 0 | DISCHARGE
Start: 2022-01-31

## 2022-01-31 RX ADMIN — Medication 600 MILLIGRAM(S): at 12:06

## 2022-01-31 RX ADMIN — Medication 600 MILLIGRAM(S): at 13:00

## 2022-01-31 RX ADMIN — Medication 975 MILLIGRAM(S): at 10:00

## 2022-01-31 RX ADMIN — Medication 975 MILLIGRAM(S): at 09:04

## 2022-01-31 RX ADMIN — Medication 1 TABLET(S): at 12:06

## 2022-01-31 RX ADMIN — Medication 600 MILLIGRAM(S): at 04:32

## 2022-01-31 NOTE — PROGRESS NOTE ADULT - ATTENDING COMMENTS
b/l accessory gland enlargement - soft, mobile  likely engorgement secondary to pregnancy   Will perform  breast ultrasound > 1 month from discharge if develop pain/ or gets bigger

## 2022-01-31 NOTE — PROGRESS NOTE ADULT - SUBJECTIVE AND OBJECTIVE BOX
Post-partum Note,   She is a  36y woman who is now post-partum day: 2    Subjective:  The patient feels well.  She is ambulating.   She is tolerating regular diet.  She denies nausea and vomiting; denies fever.  She is voiding.  Her pain is controlled.  She reports normal postpartum bleeding.  She is breastfeeding.  She is formula feeding.  She is bonding with infant.    Physical exam:    Vital Signs Last 24 Hrs  T(C): 36.7 (2022 07:45), Max: 36.8 (2022 14:40)  T(F): 98.1 (2022 07:45), Max: 98.2 (2022 14:40)  HR: 77 (2022 07:45) (59 - 81)  BP: 122/82 (2022 07:45) (108/58 - 136/94)  BP(mean): --  RR: 18 (2022 07:45) (17 - 18)  SpO2: 100% (2022 07:45) (98% - 100%)    Gen: NAD  Breast: Soft, nontender, not engorged.  Abdomen: Soft, nontender, no distension , firm uterine fundus at umbilicus.  Pelvic: Normal lochia noted  Ext: No calf tenderness    LABS:                        12.4   8.66  )-----------( 230      ( 2022 15:53 )             36.6       Rubella status:     Allergies    No Known Allergies    Intolerances      MEDICATIONS  (STANDING):  acetaminophen     Tablet .. 975 milliGRAM(s) Oral <User Schedule>  diphtheria/tetanus/pertussis (acellular) Vaccine (ADAcel) 0.5 milliLiter(s) IntraMuscular once  ibuprofen  Tablet. 600 milliGRAM(s) Oral every 6 hours  prenatal multivitamin 1 Tablet(s) Oral daily  sodium chloride 0.9% lock flush 3 milliLiter(s) IV Push every 8 hours    MEDICATIONS  (PRN):  benzocaine 20%/menthol 0.5% Spray 1 Spray(s) Topical every 6 hours PRN for Perineal discomfort  dibucaine 1% Ointment 1 Application(s) Topical every 6 hours PRN Perineal discomfort  diphenhydrAMINE 25 milliGRAM(s) Oral every 6 hours PRN Pruritus  hydrocortisone 1% Cream 1 Application(s) Topical every 6 hours PRN Moderate Pain (4-6)  lanolin Ointment 1 Application(s) Topical every 6 hours PRN nipple soreness  magnesium hydroxide Suspension 30 milliLiter(s) Oral two times a day PRN Constipation  oxyCODONE    IR 5 milliGRAM(s) Oral every 3 hours PRN Moderate to Severe Pain (4-10)  oxyCODONE    IR 5 milliGRAM(s) Oral once PRN Moderate to Severe Pain (4-10)  pramoxine 1%/zinc 5% Cream 1 Application(s) Topical every 4 hours PRN Moderate Pain (4-6)  simethicone 80 milliGRAM(s) Chew every 4 hours PRN Gas  witch hazel Pads 1 Application(s) Topical every 4 hours PRN Perineal discomfort

## 2022-01-31 NOTE — CHART NOTE - NSCHARTNOTEFT_GEN_A_CORE
Made aware by RN this AM of palpable Made aware by RN this AM of palpable left axillary lymph node  per RN, incidental finding while breastfeeding  tender to touch  RN reports that she discovered yesterday night, was not able to endorse to a provider until this AM    0630; went in to assess patient  patient asleep    will endorse to day team for assessment and plan    o.owoyele np

## 2022-01-31 NOTE — PROGRESS NOTE ADULT - ASSESSMENT
Assessment and Plan  PPD #2 s/p . GDMA2, CHTN, HELLP labs WNL, PC ratio 0.2  Patient reports noticing a lymph node in her left axillary area. Upon assessment, enlarged lymph node noted bilaterally in axillary area. B/L axillary enlarged lymph node measuring about 1 inch x 1 inch bilaterally. Patient states she has had enlarged lymph nodes in the past and had sonogram where they informed her it was an accessory gland. Patient is s/p Covid vaccine #3 administration two months ago.     BP x 24 hours: BP:  (108/58 - 136/94)    Her pain is well controlled.   She is tolerating a regular diet and passing flatus.   Denies N/V. Denies CP/SOB/lightheadedness/dizziness.   She is ambulating without difficulty.   Voiding spontaneously.    Patient is stable and doing well postpartum  - Continue regular diet.  - Increase ambulation.  - Continue motrin, tylenol, oxycodone PRN for pain control.   -Colace Rx sent to home pharmacy per pt request  -Encourage breastfeeding.    -PP educational material reviewed and provided.  -PP & PPD Instructions reviewed.  -patient to follow up outpatient postpartum for axillary sonogram in 3 months    -Discharge planning>>>D/C home today    -Follow up @ [facility] in 6 weeks for postpartum visit  [phone]    Discussed with MD Nelson Godwin   Assessment and Plan  PPD #2 s/p . GDMA2, CHTN, HELLP labs WNL, PC ratio 0.2  Patient reports noticing a lymph node in her left axillary area. Upon assessment, enlarged lymph node noted bilaterally in axillary area. B/L axillary enlarged lymph node measuring about 1 inch x 1 inch bilaterally. Patient states she has had enlarged lymph nodes in the past and had sonogram where they informed her it was an accessory gland. Patient is s/p Covid vaccine #3 administration two months ago.     Patient with h/o CHTN, previously taking Metropolol 50mg daily, medications not taken during pregnancy. Patient monitored BP's throughout pregnancy and reports all BP's WNL and therefore did not need to take HTN meds.     BP x 24 hours: BP:  (108/58 - 136/94)    Her pain is well controlled.   She is tolerating a regular diet and passing flatus.   Denies N/V. Denies CP/SOB/lightheadedness/dizziness.   She is ambulating without difficulty.   Voiding spontaneously.    Patient is stable and doing well postpartum  - Continue regular diet.  - Increase ambulation.  - Continue motrin, tylenol, oxycodone PRN for pain control.   -Colace Rx sent to home pharmacy per pt request  -BP cuff machine Rx sent to home pharmacy. P  -Patient states she has a BP machine at home and does not need another one. Made aware to take BP's TID.    -Encourage breastfeeding.    -PP educational material reviewed and provided.  -PP & PPD Instructions reviewed.  -patient to follow up outpatient postpartum for axillary sonogram in 3 months    -Discharge planning>>>D/C home today    -Follow up @ [facility] in 1 week for postpartum BP check visit  Patient to bring BP log to appt.   [phone]    Discussed with MD Nelson Godwin

## 2022-02-01 ENCOUNTER — NON-APPOINTMENT (OUTPATIENT)
Age: 37
End: 2022-02-01

## 2022-02-01 ENCOUNTER — APPOINTMENT (OUTPATIENT)
Dept: ANTEPARTUM | Facility: CLINIC | Age: 37
End: 2022-02-01

## 2022-02-02 ENCOUNTER — NON-APPOINTMENT (OUTPATIENT)
Age: 37
End: 2022-02-02

## 2022-02-08 ENCOUNTER — APPOINTMENT (OUTPATIENT)
Dept: ANTEPARTUM | Facility: CLINIC | Age: 37
End: 2022-02-08

## 2023-01-05 NOTE — OB RN TRIAGE NOTE - NS_FETALHEARTHEAR_OBGYN_ALL_OB
[facility] 75  coding opportunities          Chart Reviewed number of suggestions sent to Provider: 1     Patients Insurance        Commercial Insurance: Vanda 93     [id number] 20
Yes

## 2023-05-22 NOTE — OB RN DELIVERY SUMMARY - NS_GESTAGEATBIRTHA_OBGYN_ALL_OB_FT
38w4d Olumiant Pregnancy And Lactation Text: Based on animal studies, Olumiant may cause embryo-fetal harm when administered to pregnant women.  The medication should not be used in pregnancy.  Breastfeeding is not recommended during treatment.

## 2023-06-08 NOTE — OB PROVIDER TRIAGE NOTE - NSOBPROVIDERNOTE_OBGYN_ALL_OB_FT
Did You Provide Opioid Counseling: No 's patient is a 35 y/o EDC 12/19/2019 EGA 38 4/7 reports of no fetal movement, back pain and spotting . Patient denies cramping, loss of fluid.. Patient scheduled for IOL of labor on 12/12. Patient history significant for stillbirth in 2017. Patient reports blood pressure elevated in post partum and stillbirth, no etiology as per [facility] reports by patient. GBS status: Negative  10/31/2019    Patient initial blood pressure 140/95, denies PEC symptoms reported .    Medications: iron, asa, pnv  Allergies: NKA    AP complications:  - Shorten cervix, on Progesterone until 36 EGA  - admitted at 29 EGA, s/p Mag and Betamethasone  Medical History: CHTN, no medications   Surgical History: c/s  OBGYN History: 3/28/2017 fetal distress noted, brought to OR, no fetal heart rate, vaginal delivery of stillbirth at 36 EGA, post partum complicated by elevated blood pressure    Vital Signs Last 24 Hrs  T(C): 37 (09 Dec 2019 06:16), Max: 37 (09 Dec 2019 06:16)  T(F): 98.6 (09 Dec 2019 06:16), Max: 98.6 (09 Dec 2019 06:16)  HR: 118 (09 Dec 2019 06:21) (118 - 129)  BP: 133/87 (09 Dec 2019 06:21) (133/87 - 140/95)  RR: 18 (09 Dec 2019 06:16) (18 - 18)  SpO2: 98% (09 Dec 2019 06:13) (98% - 98%)  FHR: 150 baseline, minimum variability, no accelerations, no decelerations  CTX: 2 minutes apart  TAS: cephalic presentation  EFW: 3200 's patient is a 35 y/o EDC 12/19/2019 EGA 38 4/7 reports of no fetal movement, back pain and spotting . Patient denies cramping, loss of fluid.. Patient scheduled for IOL of labor on 12/12. Patient history significant for stillbirth in 2017. Patient reports blood pressure elevated in post partum and stillbirth, no etiology as per [facility] reports by patient. GBS status: Negative  10/31/2019    Patient initial blood pressure 140/95, denies PEC symptoms reported .    Medications: iron, asa, pnv  Allergies: NKA    AP complications:  - Shorten cervix, on Progesterone until 36 EGA  - admitted at 29 EGA, s/p Mag and Betamethasone  Medical History: CHTN, no medications   Surgical History: c/s  OBGYN History: 3/28/2017 fetal distress noted, brought to OR, no fetal heart rate, vaginal delivery of stillbirth at 36 EGA, post partum complicated by elevated blood pressure    Vital Signs Last 24 Hrs  T(C): 37 (09 Dec 2019 06:16), Max: 37 (09 Dec 2019 06:16)  T(F): 98.6 (09 Dec 2019 06:16), Max: 98.6 (09 Dec 2019 06:16)  HR: 118 (09 Dec 2019 06:21) (118 - 129)  BP: 133/87 (09 Dec 2019 06:21) (133/87 - 140/95)  RR: 18 (09 Dec 2019 06:16) (18 - 18)  SpO2: 98% (09 Dec 2019 06:13) (98% - 98%)  FHR: 150 baseline, minimum variability, no accelerations, no decelerations  CTX: 2 minutes apart  TAS: cephalic presentation  EFW: 3200    Blood pressures critically elevated:   164/103  at 0642-  notified,  notified  160/124 at 0652-  notified,  notified, Labetalol 20 mg IVP, Magnesium Sulfate to be started    Evidence of  severed PEC and labor.  - admit to labor and delivery  - for Labetalol IVP, Magnesium Sulfate   - HELLP labs pending  -  en route to hospital

## 2023-08-01 NOTE — DISCHARGE NOTE OB - PRO FEEDING PLAN INFANT OB
initiation of breastfeeding/breast milk feeding Sski Pregnancy And Lactation Text: This medication is Pregnancy Category D and isn't considered safe during pregnancy. It is excreted in breast milk.

## 2024-02-01 NOTE — OB RN TRIAGE NOTE - RESPIRATORY RATE (BREATHS/MIN)
Caller: [facility] [place] - [place], [place] - [address] - [phone] [facility] [phone] FX    Relationship: Pharmacy    Best call back number: [phone]    Requested Prescriptions:   Requested Prescriptions     Pending Prescriptions Disp Refills    Budeson-Glycopyrrol-Formoterol (Breztri Aerosphere) 160-9-4.8 MCG/ACT aerosol inhaler 10.7 g 0     Sig: Inhale 2 puffs 2 (Two) Times a Day.        Pharm[facility] where request should be sent: [facility] [place], [place] - [address] - [phone] [facility] [phone] FX     Last office visit with prescribing clinician: 1/3/2024   Last telemedicine visit with prescribing clinician: Visit date not found   Next office visit with prescribing clinician: Visit date not found       Does the patient have less than a 3 day supply:  [] Yes  [] No    Would you like a call back once the refill request has been completed: [] Yes [] No    If the office needs to give you a call back, can they leave a voicemail: [] Yes [] No    Tiffanie Arcos Rep   02/01/24 14:07 EST       
16

## 2024-04-22 NOTE — DISCHARGE NOTE OB - REASON FOR ADMISSION
I reviewed the H&P, I examined the patient, and there are no changes in the patient's condition.    INDUCTION
